# Patient Record
Sex: MALE | Race: BLACK OR AFRICAN AMERICAN | Employment: FULL TIME | URBAN - METROPOLITAN AREA
[De-identification: names, ages, dates, MRNs, and addresses within clinical notes are randomized per-mention and may not be internally consistent; named-entity substitution may affect disease eponyms.]

---

## 2017-02-08 ENCOUNTER — TELEPHONE (OUTPATIENT)
Dept: INTERNAL MEDICINE CLINIC | Age: 37
End: 2017-02-08

## 2017-02-08 NOTE — TELEPHONE ENCOUNTER
He will need to go back through orientation as it has been 1 year since last weight loss visit. Thank you!

## 2017-02-08 NOTE — TELEPHONE ENCOUNTER
Pt calling asking if he can talk to Delmar about getting back into the weight loss program.    Do we need to sign him up for the orientation again or does he need to see Delmar

## 2017-12-11 ENCOUNTER — OFFICE VISIT (OUTPATIENT)
Dept: SURGERY | Age: 37
End: 2017-12-11

## 2017-12-11 DIAGNOSIS — E66.01 OBESITY, CLASS III, BMI 40-49.9 (MORBID OBESITY) (HCC): Primary | ICD-10-CM

## 2017-12-12 VITALS — BODY MASS INDEX: 38.36 KG/M2 | HEIGHT: 76 IN | WEIGHT: 315 LBS

## 2017-12-12 NOTE — PROGRESS NOTES
Robyn Adler participated in an educational session on the importance of starting to make healthy choices prior to weight loss surgery. General healthy foods were reviewed. Diet history was reviewed. Patient set a dietary, exercise, and behavioral goal in order to start making healthy changes now.      Visit Vitals    Ht 6' 4\" (1.93 m)    Wt (!) 167.4 kg (369 lb)    BMI 44.92 kg/m2     Diandra Trujillo RD

## 2018-01-15 ENCOUNTER — OFFICE VISIT (OUTPATIENT)
Dept: SURGERY | Age: 38
End: 2018-01-15

## 2018-01-15 ENCOUNTER — CLINICAL SUPPORT (OUTPATIENT)
Dept: SURGERY | Age: 38
End: 2018-01-15

## 2018-01-15 VITALS — HEIGHT: 76 IN | BODY MASS INDEX: 38.36 KG/M2 | WEIGHT: 315 LBS

## 2018-01-15 VITALS
DIASTOLIC BLOOD PRESSURE: 95 MMHG | RESPIRATION RATE: 16 BRPM | BODY MASS INDEX: 38.36 KG/M2 | HEART RATE: 76 BPM | HEIGHT: 76 IN | WEIGHT: 315 LBS | OXYGEN SATURATION: 100 % | SYSTOLIC BLOOD PRESSURE: 148 MMHG

## 2018-01-15 DIAGNOSIS — E66.01 MORBID OBESITY WITH BODY MASS INDEX OF 40.0-49.9 (HCC): Primary | ICD-10-CM

## 2018-01-15 DIAGNOSIS — I10 ESSENTIAL HYPERTENSION: ICD-10-CM

## 2018-01-15 DIAGNOSIS — K21.9 GASTROESOPHAGEAL REFLUX DISEASE, ESOPHAGITIS PRESENCE NOT SPECIFIED: Primary | ICD-10-CM

## 2018-01-15 DIAGNOSIS — E66.01 MORBID OBESITY (HCC): ICD-10-CM

## 2018-01-15 DIAGNOSIS — E66.01 MORBID OBESITY WITH BODY MASS INDEX OF 40.0-49.9 (HCC): ICD-10-CM

## 2018-01-15 NOTE — PROGRESS NOTES
Bariatric Surgery Consultation    Subjective: The patient is a 42 y.o. obese male with a Body mass index is 43.94 kg/(m^2). Jonelle Watson The patient is at his heaviest weight for the past 5 years. 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 he has been considering surgery since last year. he desires surgery at this time because of multiple health concerns and their lifestyle issues which are hindered by their weight. he has been referred by his family physician Dr Mindy Oconnell for evaluation and treatment of their obesity via surgical intervention. Obie Tenorio has tried multiple diets in his lifetime most recently tried physician supervised, behavior modification, unsupervised diets and Atkins    Bariatric comorbidities present are   Patient Active Problem List   Diagnosis Code    Hypertension I10    Morbid obesity ([facility] [address] E66.01    Morbid obesity with body mass index of 40.0-49.9 ([facility]) E66.01    GERD (gastroesophageal reflux disease) K21.9       The patient is considering laparoscopic sleeve gastrectomy for surgical weight loss due to their ineffective progress with medical forms of weight loss and the urging of their physician who cares for their primary medical issues. The patient  now presents  for consideration for weight loss surgery understanding the benefits of this over a medical approach of weight loss as was discussed in our presentation on weight loss surgery. They have discussed their plans both with their family and primary care physician who is in support of their pursuit of such. The patient has not had health issues as of late and denies and gastrointestinal disturbances other than what is outlined below in their review of symptoms. All of their prior evaluations available by both their PCP's and specialists physicians have been reviewed today either in the Care Everywhere portal or scanned under the media tab.     I have spent a large portion of my initial consultation today reviewing the patients current dietary habits which have contributed to their health issues and obesity. I have suggested to them personally a dietary regimen that they can initiate now to help with their status as it pertains to their weight. They understand that the most important aspect of their journey through their weight loss endeavor will be their adherence to a new lifestyle of healthy eating behavior. They also understand that an adherence to an exercise program will not only help with weight loss but is ultimately important in weight maintenance. The patients goal weight is 250lb. These goals are consistent with expected outcomes of their desired operation. his Medical goals are resolution of these health issues. Patient Active Problem List    Diagnosis Date Noted    Morbid obesity ([address]     Morbid obesity with body mass index of 40.0-49.9 ([facility] [address]     GERD (gastroesophageal reflux disease)     Hypertension 12/01/2015     Past Surgical History:   Procedure Laterality Date    HX KNEE ARTHROSCOPY      HX ORTHOPAEDIC      ankle surgery / no hardware    HX TONSILLECTOMY        Social History   Substance Use Topics    Smoking status: Never Smoker    Smokeless tobacco: Never Used    Alcohol use 3.6 oz/week     6 Shots of liquor per week      Family History   Problem Relation Age of Onset    Cancer Mother     Hypertension Mother     Diabetes Mother     Diabetes Father     Hypertension Father       Current Outpatient Prescriptions   Medication Sig Dispense Refill    LORATADINE/PSEUDOEPHEDRINE (CLARITIN-D 24 HOUR PO) Take  by mouth.        No Known Allergies       Review of Systems:       General - No history or complaints of unexpected fever, chills, or weight loss  Head/Neck - No history or complaints of headache, diplopia, dysphagia, hearing loss  Cardiac - No history or complaints of chest pain, palpitations, murmur, or shortness of breath  Pulmonary - No history or complaints of shortness of breath, productive cough, hemoptysis  Gastrointestinal - minimal reflux,no  abdominal pain, obstipation/constipation or blood per rectum  Genitourinary - No history or complaints of hematuria/dysuria, stress urinary incontinence symptoms, or renal lithiasis  Musculoskeletal - mild joint pain in their knees,  no muscular weakness  Hematologic - No history or complaints of bleeding disorders,  No blood transfusions  Neurologic - No history or complaints of  migraine headaches, seizure activity, syncopal episodes, TIA or stroke  Integumentary - No history or complaints of rashes, abnormal nevi, skin cancer  Gynecological - n/a               Objective:     Visit Vitals    BP (!) 148/95 (BP 1 Location: Left arm, BP Patient Position: Sitting)    Pulse 76    Resp 16    Ht 6' 4\" (1.93 m)    Wt (!) 163.7 kg (361 lb)    SpO2 100%    BMI 43.94 kg/m2       Physical Examination: General appearance - alert, well appearing, and in no distress and oriented to person, place, and time  Mental status - alert, oriented to person, place, and time, normal mood, behavior, speech, dress, motor activity, and thought processes  Eyes - pupils equal and reactive, extraocular eye movements intact, sclera anicteric, left eye normal, right eye normal  Ears - right ear normal, left ear normal  Nose - normal and patent, no erythema, discharge or polyps and normal nontender sinuses  Mouth - mucous membranes moist, pharynx normal without lesions  Neck - supple, no significant adenopathy  Lymphatics - no palpable lymphadenopathy, no hepatosplenomegaly  Chest - clear to auscultation, no wheezes, rales or rhonchi, symmetric air entry  Heart - normal rate, regular rhythm, normal S1, S2, no murmurs, rubs, clicks or gallops  Abdomen - soft, nontender, nondistended, no masses or organomegaly  Back exam - full range of motion, no tenderness, palpable spasm or pain on motion  Neurological - alert, oriented, normal speech, no focal findings or movement disorder noted  Musculoskeletal - no joint tenderness, deformity or swelling  Extremities - peripheral pulses normal, no pedal edema, no clubbing or cyanosis    Labs:       No results found for this or any previous visit (from the past 1440 hour(s)). Assessment:     Morbid obesity with comorbidity    Plan:     laparoscopic sleeve gastrectomy    This is a 42 y.o. male with a BMI of Body mass index is 43.94 kg/(m^2). and the weight-related co-morbidties as noted below. Ap Gillespie meets the NIH criteria for bariatric surgery based upon the BMI of Body mass index is 43.94 kg/(m^2). and multiple weight-related co-morbidties. Ap Gillespie has elected laparoscopic sleeve gastrectomy as his intervention of choice for treatment of morbid obestiy through surgical means secondary to its safety profile, rapid return to work  and decreases in operative risks over gastric bypass. In the office today, following Lawrence's history and physical examination, a 30 minute discussion regarding the anatomic alterations for the laparoscopic sleeve gastrectomy was undertaken. The dietary expectations and the patient and physician dependent factors for success were thoroughly discussed, to include the need for interval follow-up and long-term dietary changes associated with success. The possible complications of the sleeve gastrectomy  were also discussed, to include;death, DVT/PE, staple line leak, bleeding, stricture formation, infection, nutritional deficiencies and sleeve dilation. Specific weight related outcomes for success were also discussed with an emphasis on careful and close follow-up with the first year and eating behavior modification as the baseline and cyclical hunger return. The patient expressed an understanding of the above factors, and his questions were answered in their entirety.     In addition, the patient attended a 1.5 hour power point seminar regarding obesity, surgical weight loss including, adjustable gastric band, gastric bypass, and sleeve gastrectomy. This discussion contrasted the different surgical techniques, mechanisms of actions and expected outcomes, and surgical and medical risks associated with each procedure. During this seminar, there was a long question and answer session where each questions was answered until there were no additional questions. Today, the patient had all of his questions answered and desires to proceed with  bariatric surgery initially choosing sleeve gastrectomy as his surgical option. Secondary Diagnoses:     Dietary Intervention  - The patient is currently scheduled to see or has been followed by a bariatric nutritionist for an attempt at preoperative weight loss as has been dictated by their insurance carrier. They will be assessed at various times during their follow up to evaluate their progress depending on the length of time that is required once again by their carrier. I have explained the importance of preoperative weight loss and the benefits regarding lower surgical risk and also assisting the patient in reaching their weight loss goal.  Finally they understand there is a physiologic benefit from the standpoint of hepatic volume reduction and reduction of central visceral adiposity preoperatively. I have reiterated the importance of a low carbohydrate and high protein regimen to achieve their stated goal. I have reviewed their current eating behavior prior to this encounter and explained to them in an exhaustive fashion the appropriate diet that they should adhere to. They have been encouraged to loose weight pre operatively and understand it is our prerogative to cancel surgery or postpone their procedure in the event of significant weight gain. The patients weight loss goal pre operatively is 5-10 pounds.     GERD -The patient understands that weight loss surgery is not a guaranteed cure for reflux disease but does understand the benefits that weight loss can have on reflux disease.  They also understand that at the time of surgery the gastroesophageal junction will be evaluated for the presence of a diaphragmatic hernia.  Hernias will be corrected always with the gastric band and sleeve gastrectomy procedures, but only on a case by case basis with the gastric bypass if it prevents our ability to perform the operation at hand, or if I feel that they would benefit long term with correction of this issue.  The patient also understands that neither weight loss surgery nor repair of a diaphragmatic hernia repair guarantees the complete cessation of the disease. They also understand there is a possibility of recurrence with a simple crural repair as is performed with these procedures. They understand they may have to continue their medications in the postoperative period. They have a good understanding that the gastric bypass procedure is better suited to total resolution of this issue and that neither the Lap Band nor sleeve gastrectomy is considered a curative procedure as it pertains to this diagnosis. Hypertension - The patient has a clear understanding of how weight loss improves hypertension as a whole, but also they understand that there is a significant genetic component to this disease process. We will monitor the patients blood pressure while in the hospital and the plan would be to continue those medications postoperatively.  If a diuretic is being used we will stop them on discharge to prevent dehydration particularly with the sleeve gastrectomy and the gastric bypass procedures.  They will be instructed to monitor their blood pressure postoperatively while at home and notify their primary care physician in the event of any significantly high or uncharacteristic readings.     Mild Weight Related Arthritis -The patient understands the benefits that weight loss surgery can have on their arthritis but also understands that weight loss is not a guaranteed cure and relief of symptoms is often dependent on the severity of the underlying disease.  The patient also understands that traditional pharmaceutical treatments for this diagnosis are usually unavailable to post-operative weight loss patients due to the effects on the gastrointestinal tract particularly with the gastric bypass and to a lesser effect with the sleeve gastrectomy.  Any changes to the patients medication treatment will ultimately be made the patients PCP with input by our office.       Signed By: Dee Mcadams MD     January 15, 2018

## 2018-01-15 NOTE — PATIENT INSTRUCTIONS
Goals: 1. Continue current exercise routine increasing as able  2. Start daily MVI ([employer] Complete Chewable) once a day before surgery and twice a day after  3. Work on eating within 2 hours of waking up   4.  Find a protein shake you can use as a meal replacement or snack

## 2018-01-15 NOTE — PATIENT INSTRUCTIONS
Body Mass Index: Care Instructions  Your Care Instructions    Body mass index (BMI) can help you see if your weight is raising your risk for health problems. It uses a formula to compare how much you weigh with how tall you are. · A BMI lower than 18.5 is considered underweight. · A BMI between 18.5 and 24.9 is considered healthy. · A BMI between 25 and 29.9 is considered overweight. A BMI of 30 or higher is considered obese. If your BMI is in the normal range, it means that you have a lower risk for weight-related health problems. If your BMI is in the overweight or obese range, you may be at increased risk for weight-related health problems, such as high blood pressure, heart disease, stroke, arthritis or joint pain, and diabetes. If your BMI is in the underweight range, you may be at increased risk for health problems such as fatigue, lower protection (immunity) against illness, muscle loss, bone loss, hair loss, and hormone problems. BMI is just one measure of your risk for weight-related health problems. You may be at higher risk for health problems if you are not active, you eat an unhealthy diet, or you drink too much alcohol or use tobacco products. Follow-up care is a key part of your treatment and safety. Be sure to make and go to all appointments, and call your doctor if you are having problems. It's also a good idea to know your test results and keep a list of the medicines you take. How can you care for yourself at home? · Practice healthy eating habits. This includes eating plenty of fruits, vegetables, whole grains, lean protein, and low-fat dairy. · If your doctor recommends it, get more exercise. Walking is a good choice. Bit by bit, increase the amount you walk every day. Try for at least 30 minutes on most days of the week. · Do not smoke. Smoking can increase your risk for health problems. If you need help quitting, talk to your doctor about stop-smoking programs and medicines. These can increase your chances of quitting for good. · Limit alcohol to 2 drinks a day for men and 1 drink a day for women. Too much alcohol can cause health problems. If you have a BMI higher than 25  · Your doctor may do other tests to check your risk for weight-related health problems. This may include measuring the distance around your waist. A waist measurement of more than 40 inches in men or 35 inches in women can increase the risk of weight-related health problems. · Talk with your doctor about steps you can take to stay healthy or improve your health. You may need to make lifestyle changes to lose weight and stay healthy, such as changing your diet and getting regular exercise. If you have a BMI lower than 18.5  · Your doctor may do other tests to check your risk for health problems. · Talk with your doctor about steps you can take to stay healthy or improve your health. You may need to make lifestyle changes to gain or maintain weight and stay healthy, such as getting more healthy foods in your diet and doing exercises to build muscle. Where can you learn more? Go to http://dez-jasmyn.info/. Enter S176 in the search box to learn more about \"Body Mass Index: Care Instructions. \"  Current as of: October 13, 2016  Content Version: 11.4  © 8529-6622 [employer]. Care instructions adapted under license by [employer] (which disclaims liability or warranty for this information). If you have questions about a medical condition or this instruction, always ask your healthcare professional. [place] [address] any warranty or liability for your use of this information. New patient Instructions      1. Ensure all pre-operative insurances requirements are complete (ie; dietary visits, psychology consults, primary care documentation, etc)    2. Adhere to pre-operative weight loss / weight maintenance plan discussed in the office today.     3. Contact the office with any questions on pre-operative clearance issues (ie; cardiology work-up, pulmonary work-up, upper GI study, etc). 4. If a barium upper GI study has been ordered for your evaluation, make sure you are on liquids only the morning of the procedure.

## 2018-01-15 NOTE — PROGRESS NOTES
Medical Weight Loss Multi-Disciplinary Program    Name: Kathya Teran   : 1980    Session# 2  Date: 1/15/2018     Height: 6' 4\" (193 cm)    Weight: (!) 163.7 kg (361 lb) lbs. Body mass index is 43.94 kg/(m^2). Pounds Lost: 8     Dietary Instructions    Reviewed intake  Understanding label reading  Understanding low carbohydrates, low sugar, higher protein meals  Understanding proper portions  Dining outside home  Instruction given for personal dietary changes  Discussed perceived compliance  Comments: Pt given brief pre/post-op diet ed and diet hx reviewed. Physical Activity/Exercise    Discussed Perceived Compliance  Reasonable Goals Set  Motivation  Comments: patient goes to the gym and runs 4 days a week for 45-60 minutes     Behavior Modification    Positive attitude  Comments: Pt is working on the following goals:    Candidate for surgery (per RD): pending     Dietitian: Mel Burgess is a 42 y.o. male who present for a pre-op evaluation. Visit Vitals    Ht 6' 4\" (1.93 m)    Wt (!) 163.7 kg (361 lb)    BMI 43.94 kg/m2     Past Medical History:   Diagnosis Date    GERD (gastroesophageal reflux disease)     uses OTC meds    Hypertension     no meds as of 2018    Morbid obesity ([address]     Morbid obesity with body mass index of 40.0-49.9 ([address]            Procedure:  laparoscopic sleeve gastrectomy     Reasons for Surgery:  BMI > 40 with one or more medically significant comorbidities    Summary:  Pt given brief pre/post-op diet ed and diet hx reviewed. Pt set several goals. See below. Current Vitamins: none     What have you done in the past to try to lose weight?  [facility] program, Ketogenic diet, vegan diet     Why didn't you lose weight or keep the weight off?: Patient was able to lose quite a bit of weight getting down to 310 doing the [facility] program, and was not able to keep it off after stopping the program     Why do you think having weight loss surgery will make it possible for you to lose weight and keep it off? Patient knows and understands he can lose weight but needs that extra push of the surgery so he can maintain his weight loss       Patient Education and Materials Provided:  Supplement Resource Guide, B Vitamin Information, MVI Recommendations, Calcium Citrate Information, Bariatric Supplement Companies, Protein Supplement Information, Fluid Requirements, No Caffeine or Carbonation, No Alcohol for One Year Post Op, 3 Balanced Meals a Day, Food Group Guide, Good Choices Dining Out, No Snacks, No Concentrated Sweets, Support System at Home, Exercising, Support Group Information and Addressed Current Habits / Changes to make    Nutritional Hx: What is the number of meals you eat per day? 2  Comment: patient typically skips breakfast     Do you eat between meals / snack? yes  Typical snack: nuts     How fast do you eat your meals? slow    How many sodas/sugared beverages do you drink per day? None     How many caffeinated drinks do you have per day? 1 cup with stevia and almond milk creamer    How much water do you drink per day?  Between  ounces a day    How often do you eat fast food? never    How often do you consume alcohol? 3 times a week; 2 Mixed drinks not really using a mixer anymore     Diet History:  Breakfast  What are you eating and how much? i   When? ii   Where? iii   Snacks  What are you eating and how much? i   When? ii   Where? iii   Hydration  What are you eating and how much? i   When? ii   Where? ii   Lunch  What are you eating and how much? i   When? ii   Where? iii   Snacks  What are you eating and how much? i   When? ii   Where? iii   Hydration  What are you eating and how much? i   When? ii   Where? iii   Dinner  What are you eating and how much? i   When? ii   Where? iii   Snacks  What are you eating and how much? i   When? ii   Where? iii   Hydration  What are you eating and how much? i   When? ii   Where? iii Exercise:  Do you currently have an exercise routine? yes  What kind of exercise do you do? patient works out doing at home tapes and going to the gym . For how long? 4 days a week  For how often? 45 -60 minutes    Goals:   1. Continue current exercise routine increasing as able  2. Start daily MVI ([employer] Complete Chewable) once a day before surgery and twice a day after  3. Work on eating within 2 hours of waking up   4.  Find a protein shake you can use as a meal replacement or snack

## 2018-07-12 ENCOUNTER — CLINICAL SUPPORT (OUTPATIENT)
Dept: FAMILY MEDICINE CLINIC | Age: 38
End: 2018-07-12

## 2018-07-12 DIAGNOSIS — E66.9 OBESITY (BMI 30-39.9): Primary | ICD-10-CM

## 2018-07-12 NOTE — PROGRESS NOTES
Patient attended a Medically Supervised Weight Loss New Patient Orientation today where we discussed:  - New Direction Very Low Calorie Diet details  - Medical Supervision  - Nutrition education  - Cost of Meal Replacements  - Policies and compliance required for program enrollment.      Patients initial consultation with physician is tentatively scheduled for:  Future Appointments  Date Time Provider Danna Pérez   8/2/2018 1:00 PM Alicia Pardo NP [facility]

## 2018-07-25 ENCOUNTER — HOSPITAL ENCOUNTER (OUTPATIENT)
Dept: LAB | Age: 38
Discharge: HOME | End: 2018-07-25

## 2018-07-25 PROCEDURE — 99001 SPECIMEN HANDLING PT-LAB: CPT | Performed by: NURSE PRACTITIONER

## 2018-07-26 LAB
25(OH)D3+25(OH)D2 SERPL-MCNC: 20.9 NG/ML (ref 30–100)
ALBUMIN SERPL-MCNC: 4.4 G/DL (ref 3.5–5.5)
ALBUMIN/GLOB SERPL: 1.9 {RATIO} (ref 1.2–2.2)
ALP SERPL-CCNC: 69 IU/L (ref 39–117)
ALT SERPL-CCNC: 39 IU/L (ref 0–44)
APPEARANCE UR: CLEAR
AST SERPL-CCNC: 20 IU/L (ref 0–40)
BASOPHILS # BLD AUTO: 0 X10E3/UL (ref 0–0.2)
BASOPHILS NFR BLD AUTO: 0 %
BILIRUB SERPL-MCNC: 0.4 MG/DL (ref 0–1.2)
BILIRUB UR QL STRIP: NEGATIVE
BUN SERPL-MCNC: 14 MG/DL (ref 6–24)
BUN/CREAT SERPL: 13 (ref 9–20)
CALCIUM SERPL-MCNC: 9.1 MG/DL (ref 8.7–10.2)
CHLORIDE SERPL-SCNC: 106 MMOL/L (ref 96–106)
CHOLEST SERPL-MCNC: 166 MG/DL (ref 100–199)
CO2 SERPL-SCNC: 24 MMOL/L (ref 20–29)
COLOR UR: YELLOW
CREAT SERPL-MCNC: 1.04 MG/DL (ref 0.76–1.27)
EOSINOPHIL # BLD AUTO: 0.1 X10E3/UL (ref 0–0.4)
EOSINOPHIL NFR BLD AUTO: 1 %
ERYTHROCYTE [DISTWIDTH] IN BLOOD BY AUTOMATED COUNT: 15 % (ref 12.3–15.4)
EST. AVERAGE GLUCOSE BLD GHB EST-MCNC: 114 MG/DL
GLOBULIN SER CALC-MCNC: 2.3 G/DL (ref 1.5–4.5)
GLUCOSE SERPL-MCNC: 88 MG/DL (ref 65–99)
GLUCOSE UR QL: NEGATIVE
HBA1C MFR BLD: 5.6 % (ref 4.8–5.6)
HCT VFR BLD AUTO: 42.7 % (ref 37.5–51)
HDLC SERPL-MCNC: 59 MG/DL
HGB BLD-MCNC: 13.9 G/DL (ref 13–17.7)
HGB UR QL STRIP: NEGATIVE
IMM GRANULOCYTES # BLD: 0 X10E3/UL (ref 0–0.1)
IMM GRANULOCYTES NFR BLD: 0 %
INTERPRETATION, 910389: NORMAL
KETONES UR QL STRIP: NEGATIVE
LDLC SERPL CALC-MCNC: 94 MG/DL (ref 0–99)
LEUKOCYTE ESTERASE UR QL STRIP: NEGATIVE
LYMPHOCYTES # BLD AUTO: 2.1 X10E3/UL (ref 0.7–3.1)
LYMPHOCYTES NFR BLD AUTO: 30 %
MAGNESIUM SERPL-MCNC: 2.1 MG/DL (ref 1.6–2.3)
MCH RBC QN AUTO: 26.6 PG (ref 26.6–33)
MCHC RBC AUTO-ENTMCNC: 32.6 G/DL (ref 31.5–35.7)
MCV RBC AUTO: 82 FL (ref 79–97)
MICRO URNS: NORMAL
MONOCYTES # BLD AUTO: 0.4 X10E3/UL (ref 0.1–0.9)
MONOCYTES NFR BLD AUTO: 5 %
NEUTROPHILS # BLD AUTO: 4.4 X10E3/UL (ref 1.4–7)
NEUTROPHILS NFR BLD AUTO: 64 %
NITRITE UR QL STRIP: NEGATIVE
PH UR STRIP: 5 [PH] (ref 5–7.5)
PLATELET # BLD AUTO: 244 X10E3/UL (ref 150–379)
POTASSIUM SERPL-SCNC: 4.4 MMOL/L (ref 3.5–5.2)
PROT SERPL-MCNC: 6.7 G/DL (ref 6–8.5)
PROT UR QL STRIP: NEGATIVE
RBC # BLD AUTO: 5.23 X10E6/UL (ref 4.14–5.8)
SODIUM SERPL-SCNC: 145 MMOL/L (ref 134–144)
SP GR UR: 1.02 (ref 1–1.03)
TRIGL SERPL-MCNC: 64 MG/DL (ref 0–149)
URATE SERPL-MCNC: 7.6 MG/DL (ref 3.7–8.6)
UROBILINOGEN UR STRIP-MCNC: 0.2 MG/DL (ref 0.2–1)
VLDLC SERPL CALC-MCNC: 13 MG/DL (ref 5–40)
WBC # BLD AUTO: 7 X10E3/UL (ref 3.4–10.8)

## 2018-08-02 ENCOUNTER — OFFICE VISIT (OUTPATIENT)
Dept: INTERNAL MEDICINE CLINIC | Age: 38
End: 2018-08-02

## 2018-08-02 VITALS
DIASTOLIC BLOOD PRESSURE: 102 MMHG | RESPIRATION RATE: 18 BRPM | HEIGHT: 76 IN | BODY MASS INDEX: 38.36 KG/M2 | TEMPERATURE: 98.9 F | OXYGEN SATURATION: 97 % | WEIGHT: 315 LBS | SYSTOLIC BLOOD PRESSURE: 144 MMHG | HEART RATE: 88 BPM

## 2018-08-02 DIAGNOSIS — E66.01 MORBID OBESITY WITH BMI OF 45.0-49.9, ADULT (HCC): Primary | ICD-10-CM

## 2018-08-02 DIAGNOSIS — I10 ESSENTIAL HYPERTENSION: ICD-10-CM

## 2018-08-02 DIAGNOSIS — K21.9 GASTROESOPHAGEAL REFLUX DISEASE, ESOPHAGITIS PRESENCE NOT SPECIFIED: ICD-10-CM

## 2018-08-02 RX ORDER — VERAPAMIL HYDROCHLORIDE 240 MG/1
240 CAPSULE, EXTENDED RELEASE ORAL DAILY
Qty: 30 CAP | Refills: 2 | Status: SHIPPED | OUTPATIENT
Start: 2018-08-02 | End: 2018-08-31 | Stop reason: SDUPTHER

## 2018-08-02 NOTE — MR AVS SNAPSHOT
[facility] 
 
 
 [address], [address] 
[phone] Patient: Rose Trevino MRN: [id number] RBR:8[birth year]40 Visit Information Date & Time Provider Department Dept. Phone Encounter #  
 8/2/2018  1:00 PM Fidel Mei NP [facility] [phone] Follow-up Instructions Return in about 4 weeks (around 8/30/2018). Your Appointments 8/30/2018  9:89 AM  
METABOLIC PROGRAM 15 with Fidel Mei NP [facility] ([facility]) Appt Note: 4 week f/u  
 [address] [id number] [address] [place]  
  
   
 [address], [address]  
  
    
 8/31/2018 10:00 AM  
New Patient with Jeyson Ferrera MD  
[facility] Appt Note: nppe establish care [address], [facility] [address] [address] [place]  
  
   
 [address], [address] Upcoming Health Maintenance Date Due DTaP/Tdap/Td series (1 - Tdap) 2/7/1999 Influenza Age 5 to Adult 8/1/2018 Allergies as of 8/2/2018  Review Complete On: 8/2/2018 By: Fidel Mei NP Severity Noted Reaction Type Reactions Lozol [Indapamide] High 08/02/2018    Other (comments) Severe cramping in stomach area Current Immunizations  Never Reviewed No immunizations on file. Not reviewed this visit You Were Diagnosed With   
  
 Codes Comments Morbid obesity with BMI of 45.0-49.9, adult ([facility]    -  Primary ICD-10-CM: E66.01, Z68.42 
ICD-9-CM: 278.01, V85.42 Essential hypertension     ICD-10-CM: I10 
ICD-9-CM: 401.9 Gastroesophageal reflux disease, esophagitis presence not specified     ICD-10-CM: K21.9 ICD-9-CM: 530.81 Vitals BP Pulse Temp Resp Height(growth percentile) Weight(growth percentile) (!) 190/121 (BP 1 Location: Left arm, BP Patient Position: Sitting) 88 98.9 °F (37.2 °C) (Oral) 18 6' 4\" (1.93 m) (!) 379 lb 3.2 oz (172 kg) SpO2 BMI Smoking Status 97% 46.16 kg/m2 Never Smoker Vitals History BMI and BSA Data Body Mass Index Body Surface Area  
 46.16 kg/m 2 3.04 m 2 Preferred Pharmacy Pharmacy Name Phone [address] [place] [address], [facility] [phone] Your Updated Medication List  
  
   
This list is accurate as of 8/2/18  2:08 PM.  Always use your most recent med list.  
  
  
  
  
 verapamil  mg ER capsule Commonly known as:  Ney Wood Take 1 Cap by mouth daily. Prescriptions Sent to Pharmacy Refills  
 verapamil ER (VERELAN) 240 mg ER capsule 2 Sig: Take 1 Cap by mouth daily. Class: Normal  
 Pharmacy: [address] [facility] [address] [facility], [address] [facility] #: [phone] Route: Oral  
  
Follow-up Instructions Return in about 4 weeks (around 8/30/2018). Patient Instructions Health Maintenance Due Topic Date Due  
 DTaP/Tdap/Td series (1 - Tdap) 02/07/1999  Influenza Age 5 to Adult  08/01/2018 Monthly goal: 
 
10-15 lbs weight loss 4 meal replacements a day. No other food. First one within about 1 hour of waking up to get metabolism started. Don't go more than 6 hours between meals. No more than 1 soup a day- too much salt No more than 1 bar a day- not enough protein.  
  
10 carbs extra a day. Compliance 
  
We do not expect perfection. However, we do ask for your persistence and your willingness to do the work of growing yourself.  
  
You will hit plateaus in your weight loss. You will run into situations that test your will power. Alessia Tovar will encounter times when you feel frustrated. It's OK if you slip up from time to time.  However, how your respond to your slip ups and, the adjustments you make to prevent future slip ups will determine you long-term success. 
  
If you find yourself temporarily slipping in the program, it's OK. We will gently nudge you forward and encourage you to do the work of identifying and moving beyond your stuck areas. However, if you find yourself wavering in the program for a prolonged time (more than 3 or 4 months) we will ask that you take a break from the program. At that time you will 3 options:  
  
1. Work with our weight loss and nutrition program at [facility] to explore additional weight loss options.  
  
2. Work with a counselor to do some focused work in order to identify and break the patterns that are holding you back.  
  
3. The combination of both these. 
  
Once you, your counselor and/or provider feel that you have moved past those patterns and want to give the program another chance, we will gladly work with you to determine if you're ready to start back in the program. 
  
When returning after a break, if it's been less than 3 months, you do not need to repeat an orientation, labs or an EKG. If it's over been 3 months you will required to repeat an orientation and, if greater than 6 months, you will be required to repeat an orientation, labs and an EKG.   
  
Additional Tips 
  
- Consume your 4 daily meal replacements equally spaced over the day No more than 6 hours between each meal 
Breakfast is especially important 
  
- Get the support of family and friends 
  
- Snack-proof your house 
  
- Have strategies for social situations, meetings that run over or vacation 
  
  
- When you fall off the plan it's no big deal; just start right back. Turn those days into examples of what to change or avoid next time.  
  
  
 
Homework for [employer] 
 
Exercise Exercise daily  
- Start slow, gradually increase your time by around 10 to 20 % a week - To prevent injury, take a recovery week every 4 weeks (reduce your exercise time and intensity by 1/2 during this time) - Your goal is to work up to 150 min a week; hard enough that you can't whistle or sing. It may take 6 months to work up to this. - Call the Health  at [facility] labs for exercise ideas ([phone]) Diet Common Side Effects 
 
-Constipation 
-Fatigue 
-Hunger 
-Low sodium 
-Hair Loss 1. Constipation  
     -around 1 out of 5 chance it happens at all 
     -around 1 out of 10 chance you'll need to take something (see below) It can be prevented by Drinking at least 8 glasses of water a day If you're prone to constipation: - Take a Stool Softener every day:  (eg - Dulcolax Stool Softener 100 mg or Miralax) - Eat Plenty of Fiber Get the New Direction products with fiber added Keep your fiber intake to at least 20 grams a day Use SUGAR-FREE products: Fiber One products, Benefiber or Metamucil If you get constipated: 1. Start with a Stool Softener (produces a bowel movement in 72 hr): 
 Examples: 
  Miralax 1 capful twice a day (It's OK to go up to 3 caps for a few days) Dulcolax Stool Softener 100 mg 
 
2. If you still have constipation after 2 or 3 days, add a Stimulant Laxative to the Stool Softener (this will produce a bowel movement in 6 - 12 hr): 
 Examples: 
  Exlax (1 small square) for up to 4 days Dulcolax stimulant laxative Magnesium citrate pill 500 mg start with once a day and go up to 3 times a day if needed 3. Or you can go straight to a combination Stool Softner / Stimulant at night. If you need, you can add a morning dose. Examples: 
  Pericolace 50 mg / 8.25 mg Sennakot-S  50 mg / 8.25 mg 
 
4. If You're Really locked up, get Liquid Magnesium Citrate (take according to the      directions) 2. Fatigue 
     -Everyone goes through this stage More common in the first 2 weeks It's your body adjusting to the Ketogenic diet and it's normal  
 
 
3. Hunger More common in the first few days After your body adjusts to the Ketogenic diet it will go away 4. Low blood levels of sodium 
     -Not very common, especially if you're not taking a fluid pil If you develop a headache, feel fatigued, light-headed or dizzy Add 1/2 cup of bouillon broth every day 5. Hair loss 
     -This is fairly uncommon; you may see more than a usual amount of hair in your brush or the shower drain This can happen with physical stress (surgery, trauma or calorie restriction) or psychological stress. Although is it very concerning, it is often temporary and will resolve within 3 months. Some people notice a benefit from taking a daily dose of Biotin 2,500 mcg and increasing their Fish Oil intake. Other Tips and Helpful Information - Get the following books (all found on [employer]) Change Anything by Anuj Yuan, Suzanne Roldan, and Geremias Rivas Mindless Eating by Desmond Logan Perfectly Yourself by Citlali Santizo, Myself and I - 28 Days to Self-Love by Shyann Mays - Consume your 4 daily meal replacements equally spaced over the    day No more than 6 hours between each meal 
 Breakfast is especially important - Get the support of family and friends 
 
- Snack-proof your house - Have strategies for social situations, meetings that run over or vacation - When you fall off the plan it's no big deal; just start right back; turn those days into examples of what to avoid next time. Long-term Healthy Weight / Body Fat Different ways to determining your ideal weight: 
1. Keep your waist to less than 1/2 of your height 2. Keep your % body fat to under 30% for female and under 20% if male 3. Keep your BMI around 25 Supplements 1. Vitacost Synergy Basic Multi-Vitamin (Their In-House Brand) Take 1 capsule twice a day Found ONLY at [facility], NOT at [facility], [facility], the Vitamin Shoppe, etc 
    SKU #: [id number]  
    $16.49   / 1 month supply 
    www. [employer]  [phone]  
 
 
2. N-Acetyl Cysteine (NAC) -- 600 mg 
     1 pill once a day Found at many stores but [address] has a good price: 
     Item #: [id number] [id number]  $9.99 / 120 pills (4 month supply) 3. Turmeric with Black Pepper Extract (or Bioperine) 500 mg 
    1 pill 1-2 times a day (more is joint pain or increased inflammation) Found at many stores but [address] has a good price: 
    SKU #: [id number]  $13.64 / 60 pills 4. Fish Oil Take 1 capsule a day [employer] Brand: \"Omega 3 Fish Oil (per pill:  )\" OR 
 
Take 1 Tbsp 3 days a week of any of the following: 
 
V[employer] Brand: Lemon or Orange flavored Fish Oil Nutra Sea + D:  Apple flavored Nutra Sea:  Nathan flavored (These are found at most Vitamin Stores or on [address] [facility] [address]) FYI:  
Typical fish oil per pill =  mg and  mg 
Krill oil per pill = EPA 50 - 70 mg and DHA 27 - 250 mg 
 
 
^^^^^^^^^^^^^^^^^^^^^^^^^^^^^^^^^^^^^^^^^^^^^^^^^^^^^^^^^^^^^^^^^^^^^^^^^^^^^^^^^^^^^^^^^^^^^^^ Carbohydrates If you do not metabolize carbohydrates well, you will lose weight and keep the weigh off by keeping your carbohydrate intake low. How do you know if you do not metabolize carbs well? If your Triglycerides, sdLCL-C and Insulin Resistance are elevated, then you will do well to follow a low carb diet. Fun Facts About Carbs - The Typical American Diet = 450 grams a day - The Reducing Phase of the VLCD = 40 grams a day - Target for weight loss maintenance: 
 - Eat no more than 30 grams per meal 
 - Eat no more than 10 grams per snack (mid-morning and mid-afternoon snack) Resources for finding out where carbs hide in our foods: 
1. Our Registered Dietitians 2. Www. [employer]. [employer]ric[employer]/tools 3. Read food labels 4. Books - The Calorie Ermelinda Burner - The New Atkins Diet for a New You 
     - Alice Gallo's NEW Carb and Calorie 4th Edition (my favorite) 5. Smart phone and Internet-based apps - [employer]  
     - Carb Manager If you want to get a better picture of your cardiac risk, consider getting a coronary calcium score:  Call [phone] to schedule one. Introducing [facility]! Dear Yazmin Tang: Thank you for requesting a IIZI group account. Our records indicate that you already have an active IIZI group account. You can access your account anytime at [email or web address] Did you know that you can access your hospital and ER discharge instructions at any time in IIZI group? You can also review all of your test results from your hospital stay or ER visit. Additional Information If you have questions, please visit the Frequently Asked Questions section of the IIZI group website at [email or web address] Remember, IIZI group is NOT to be used for urgent needs. For medical emergencies, dial 911. Now available from your iPhone and Android! Please provide this summary of care documentation to your next provider. Your primary care clinician is listed as Paul Werner. If you have any questions after today's visit, please call [phone].

## 2018-08-02 NOTE — PROGRESS NOTES
VLCD Progress Note: Initial Physician Visit    Arleen Haro is a 34 y.o. male who is here for medical screening for the VLCD Program.    Pt with HTN hx, has been on lozol with severe abd cramping and fatigue, was switched to norvasc and had LE edema. Was seen by cardiology, note personally reviewed, with normal EKG and low normal EF 55%. Also had noted elevated liver enzymes with GI follow up and normal liver US. Pt has not been on blood pressure medication recently. He states he takes home blood pressure readings that are usually 286-060 systolic. Discussed multiple options of starting on antihypertensives. Will use CCB as per Dr. Brian Calvert- Cardiology recommendation and pt intolerant to norvasc. He has follow up with Dr. Krista Garza scheduled for end of this month to start with new PCP. Weight History  Current weight 379.2lb Body mass index is 46.16 kg/(m^2). Goal weight 280 lb  Highest weight 389 lb, at 34years old    Weight loss History  How many weight loss attempts have you had? 2  Which program were you most successful at? [facility]    Significant Medical History  MI w/ in the last 3 months: no  Type I Diabetes: no  Type II Diabetes: no  Liver or Kidney disease requiring protein restriction: no  Pregnant or planning on becoming pregnant w/in 6 months: no  Recent treatment for Cancer: no  History of Uric-acid Kidney Stone or elevated Uric Acid: no  Peptic ulcer disease not well controlled: no  Recent onset of Inflammatory Bowel Disease: no    If female:  No LMP for male patient.   n/a    Significant Psychosocial History  Major lifestyle changes: no   Other commitments: no   Any potential unsupportive: no     Current psychotherapy: no  Ever hospitalized for psychiatric reasons: no  History of depression: no  History of suicidal ideation: no  Dramatic mood changes during dieting: no  History of binge eating: no  If yes, is there a history of purging: no    Social History  Social History   Substance Use Topics    Smoking status: Never Smoker    Smokeless tobacco: Never Used    Alcohol use 3.6 oz/week     6 Shots of liquor per week       Has Angélica Patel ever been told by a physician not to exercise: no    Does Angélica Patel know of any reason they shouldn't exercise: no  If yes, why? Does Angélica Patel have any food allergies or sensitivities: no    Allergies include: Allergies   Allergen Reactions    Lozol [Indapamide] Other (comments)     Severe cramping in stomach area       PHQ over the last two weeks 8/2/2018   Little interest or pleasure in doing things Not at all   Feeling down, depressed, irritable, or hopeless Not at all   Total Score PHQ 2 0         Objective    Visit Vitals    BP (!) 144/102 (BP 1 Location: Right arm, BP Patient Position: Sitting)    Pulse 88    Temp 98.9 °F (37.2 °C) (Oral)    Resp 18    Ht 6' 4\" (1.93 m)    Wt (!) 379 lb 3.2 oz (172 kg)    SpO2 97%    BMI 46.16 kg/m2     Appearance: Obese, A&O x 3, NAD  HEENT:  NC/AT, PERRL  Neck:  No nodes, no JVD  Heart:  RRR without M/R/G  Lungs:  CTAB, no rhonchi, rales, or wheezes with good air exchange   Abdomen:  Non-tender, pos bowel sounds, no hepatosplenomegally  Ext:  No C/C/E      Labs    [facility] labs reviewed extensively with patient, will follow up with PCP concerning blood pressure.   Will continue with monthly CMP, uric acid checks    CBC:   Lab Results   Component Value Date/Time    WBC 7.0 07/25/2018 12:00 AM    RBC 5.23 07/25/2018 12:00 AM    HGB 13.9 07/25/2018 12:00 AM    HCT 42.7 07/25/2018 12:00 AM    PLATELET 555 74/36/6234 12:00 AM     CMP:   Lab Results   Component Value Date/Time    Glucose 88 07/25/2018 12:00 AM    Sodium 145 (H) 07/25/2018 12:00 AM    Potassium 4.4 07/25/2018 12:00 AM    Chloride 106 07/25/2018 12:00 AM    CO2 24 07/25/2018 12:00 AM    BUN 14 07/25/2018 12:00 AM    Creatinine 1.04 07/25/2018 12:00 AM    Calcium 9.1 07/25/2018 12:00 AM    Anion gap 10 01/19/2016 10:19 AM BUN/Creatinine ratio 13 07/25/2018 12:00 AM    Alk. phosphatase 69 07/25/2018 12:00 AM    Protein, total 6.7 07/25/2018 12:00 AM    Albumin 4.4 07/25/2018 12:00 AM    Globulin 3.4 01/19/2016 10:19 AM    A-G Ratio 1.9 07/25/2018 12:00 AM      Lab Results   Component Value Date/Time    Hemoglobin A1c 5.6 07/25/2018 12:00 AM    Hemoglobin A1c 5.4 01/19/2016 10:19 AM    Glucose 88 07/25/2018 12:00 AM    LDL, calculated 94 07/25/2018 12:00 AM    Creatinine 1.04 07/25/2018 12:00 AM      Lab Results   Component Value Date/Time    Cholesterol, total 166 07/25/2018 12:00 AM    HDL Cholesterol 59 07/25/2018 12:00 AM    LDL, calculated 94 07/25/2018 12:00 AM    Triglyceride 64 07/25/2018 12:00 AM    CHOL/HDL Ratio 4.0 01/19/2016 10:19 AM     Lab Results   Component Value Date/Time    TSH 0.67 01/19/2016 10:19 AM    TSH 0.73 01/13/2016 10:00 AM          Assessment/Plan    ICD-10-CM ICD-9-CM    1. Morbid obesity with BMI of 45.0-49.9, adult ([facility] E66.01 278.01 CANCELED: AMB POC EKG ROUTINE W/ 12 LEADS, INTER & REP    Z68.42 V85.42    2. Essential hypertension I10 401.9 verapamil ER (VERELAN) 240 mg ER capsule   3. Gastroesophageal reflux disease, esophagitis presence not specified K21.9 530.81        Diet regimen   # of meal replacements prescribed: 0, if blood pressure ok after 1 week on new medication will start on 4 replacements/day   If modified LCD-nutritional guidelines:    Monthly Goal   15 lbs weight loss    Medical monitoring schedule:   Weekly BP/Weight checks   Monthly provider appointments      I have reviewed/discussed the above normal BMI with the patient. I have recommended the following interventions: dietary management education, guidance, and counseling, monitor weight and prescribed dietary intake . .      Mr. Sofya Sandoval has a reminder for a \"due or due soon\" health maintenance. I have asked that he contact his primary care provider for follow-up on this health maintenance.

## 2018-08-02 NOTE — PROGRESS NOTES
Chief Complaint   Patient presents with    Weight Management     Consult     1. Have you been to the ER, urgent care clinic since your last visit? Hospitalized since your last visit? No    2. Have you seen or consulted any other health care providers outside of the [address] [place] [address] since your last visit? Include any pap smears or colon screening.  No

## 2018-08-02 NOTE — PATIENT INSTRUCTIONS
Health Maintenance Due   Topic Date Due    DTaP/Tdap/Td series (1 - Tdap) 02/07/1999    Influenza Age 5 to Adult  08/01/2018         Monthly goal:    10-15 lbs weight loss    4 meal replacements a day. No other food. First one within about 1 hour of waking up to get metabolism started. Don't go more than 6 hours between meals. No more than 1 soup a day- too much salt  No more than 1 bar a day- not enough protein.      10 carbs extra a day. Compliance     We do not expect perfection. However, we do ask for your persistence and your willingness to do the work of growing yourself.      You will hit plateaus in your weight loss. You will run into situations that test your will power. Ronny Velasco will encounter times when you feel frustrated. It's OK if you slip up from time to time. However, how your respond to your slip ups and, the adjustments you make to prevent future slip ups will determine you long-term success.     If you find yourself temporarily slipping in the program, it's OK. We will gently nudge you forward and encourage you to do the work of identifying and moving beyond your stuck areas. However, if you find yourself wavering in the program for a prolonged time (more than 3 or 4 months) we will ask that you take a break from the program. At that time you will 3 options:      1. Work with our weight loss and nutrition program at [facility] to explore additional weight loss options.      2. Work with a counselor to do some focused work in order to identify and break the patterns that are holding you back.      3. The combination of both these.     Once you, your counselor and/or provider feel that you have moved past those patterns and want to give the program another chance, we will gladly work with you to determine if you're ready to start back in the program.     When returning after a break, if it's been less than 3 months, you do not need to repeat an orientation, labs or an EKG.  If it's over been 3 months you will required to repeat an orientation and, if greater than 6 months, you will be required to repeat an orientation, labs and an EKG.       Additional Tips     - Consume your 4 daily meal replacements equally spaced over the day  No more than 6 hours between each meal  Breakfast is especially important     - Get the support of family and friends     - Snack-proof your house     - Have strategies for social situations, meetings that run over or vacation        - When you fall off the plan it's no big deal; just start right back. Turn those days into examples of what to change or avoid next time.           Homework for [employer]    Exercise    Exercise daily   - Start slow, gradually increase your time by around 10 to 20 % a week    - To prevent injury, take a recovery week every 4 weeks (reduce your exercise time and intensity by 1/2 during this time)    - Your goal is to work up to 150 min a week; hard enough that you can't whistle or sing. It may take 6 months to work up to this. - Call the Health  at [facility] labs for exercise ideas ([phone])          Diet          Common Side Effects    -Constipation  -Fatigue  -Hunger  -Low sodium  -Hair Loss      1. Constipation        -around 1 out of 5 chance it happens at all       -around 1 out of 10 chance you'll need to take something (see below)    It can be prevented by Drinking at least 8 glasses of water a day    If you're prone to constipation:  - Take a Stool Softener every day:  (eg - Dulcolax Stool Softener 100 mg or Miralax)  - Eat Plenty of Fiber   Get the New Direction products with fiber added   Keep your fiber intake to at least 20 grams a day   Use SUGAR-FREE products: Fiber One products, Benefiber or Metamucil      If you get constipated:  1.  Start with a Stool Softener (produces a bowel movement in 72 hr):   Examples:    Miralax 1 capful twice a day (It's OK to go up to 3 caps for a few days)    Dulcolax Stool Softener 100 mg    2. If you still have constipation after 2 or 3 days, add a Stimulant Laxative to the Stool Softener (this will produce a bowel movement in 6 - 12 hr):   Examples:    Exlax (1 small square) for up to 4 days    Dulcolax stimulant laxative    Magnesium citrate pill 500 mg start with once a day and go up to 3 times a day if needed    3. Or you can go straight to a combination Stool Softner / Stimulant at night. If you need, you can add a morning dose. Examples:    Pericolace 50 mg / 8.25 mg    Sennakot-S  50 mg / 8.25 mg    4. If You're Really locked up, get Liquid Magnesium Citrate (take according to the      directions)      2. Fatigue       -Everyone goes through this stage  More common in the first 2 weeks  It's your body adjusting to the Ketogenic diet and it's normal       3. Hunger  More common in the first few days  After your body adjusts to the Ketogenic diet it will go away       4. Low blood levels of sodium       -Not very common, especially if you're not taking a fluid pil  If you develop a headache, feel fatigued, light-headed or dizzy  Add 1/2 cup of bouillon broth every day      5. Hair loss       -This is fairly uncommon; you may see more than a usual amount of hair in your brush or the shower drain  This can happen with physical stress (surgery, trauma or calorie restriction) or psychological stress. Although is it very concerning, it is often temporary and will resolve within 3 months. Some people notice a benefit from taking a daily dose of Biotin 2,500 mcg and increasing their Fish Oil intake.       Other Tips and Helpful Information    - Get the following books (all found on [address])    Change Anything by Alexia Negro, Kiki Montalvo, and Ivis Barreto    Mindless Eating by Minor Emperor    Perfectly Yourself by Narinder Noun    Me, Myself and I - 28 Days to Self-Love by Ney Haas your 4 daily meal replacements equally spaced over the    day   No more than 6 hours between each meal   Breakfast is especially important    - Get the support of family and friends    - Snack-proof your house    - Have strategies for social situations, meetings that run over or vacation      - When you fall off the plan it's no big deal; just start right back; turn those days into examples of what to avoid next time. Long-term Healthy Weight / Body Fat  Different ways to determining your ideal weight:  1. Keep your waist to less than 1/2 of your height   2. Keep your % body fat to under 30% for female and under 20% if male  3. Keep your BMI around 25        Supplements      1. Vitacost Synergy Basic Multi-Vitamin (Their In-House Brand)      Take 1 capsule twice a day        Found ONLY at [facility], NOT at [facility], [employer], [employer], the Vitamin Shoppe, etc      SKU #: [id number]       $16.49   / 1 month supply      www. [employer]  [phone]       2. N-Acetyl Cysteine (NAC) -- 600 mg       1 pill once a day       Found at many stores but [employer] has a good price:       Item #: [id number]  $9.99 / 120 pills (4 month supply)      3. Turmeric with Black Pepper Extract (or Bioperine) 500 mg      1 pill 1-2 times a day (more is joint pain or increased inflammation)      Found at many stores but [employer] has a good price:      SKU #: [id number]  $13.64 / 60 pills        4.  Fish Oil      Take 1 capsule a day      Vitamin Shoppe Brand: \"Omega 3 Fish Oil (per pill:  )\"                                                               OR    Take 1 Tbsp 3 days a week of any of the following:    Vitamin Shoppe Brand: Lemon or Orange flavored Fish Oil   Nutra Sea + D:  Apple flavored   Nutra Sea:  [place] flavored   (These are found at most Vitamin Stores or on [employer])    FYI:   Typical fish oil per pill =  mg and  mg  Krill oil per pill = EPA 50 - 70 mg and DHA 27 - 250 mg      ^^^^^^^^^^^^^^^^^^^^^^^^^^^^^^^^^^^^^^^^^^^^^^^^^^^^^^^^^^^^^^^^^^^^^^^^^^^^^^^^^^^^^^^^^^^^^^^      Carbohydrates     If you do not metabolize carbohydrates well, you will lose weight and keep the weigh off by keeping your carbohydrate intake low. How do you know if you do not metabolize carbs well? If your Triglycerides, sdLCL-C and Insulin Resistance are elevated, then you will do well to follow a low carb diet. Fun Facts About Carbs    - The Typical American Diet = 450 grams a day    - The Reducing Phase of the VLCD = 40 grams a day    - Target for weight loss maintenance:   - Eat no more than 30 grams per meal   - Eat no more than 10 grams per snack (mid-morning and mid-afternoon snack)        Resources for finding out where carbs hide in our foods:  1. Our Registered Dietitians    2. Www. Atkins. com/tools    3. Read food labels    4. Books       - The Calorie Dasha Emms       - The Relavance Software System Diet for a New You       - Alice Gallo's NEW Carb and Calorie 4th Edition (my favorite)    5. Smart phone and Internet-based apps       - ChunyuPal        - Carb Manager      If you want to get a better picture of your cardiac risk, consider getting a coronary calcium score:  Call [phone] to schedule one.

## 2018-08-08 ENCOUNTER — CLINICAL SUPPORT (OUTPATIENT)
Dept: FAMILY MEDICINE CLINIC | Age: 38
End: 2018-08-08

## 2018-08-08 DIAGNOSIS — E66.01 MORBID OBESITY (HCC): Primary | ICD-10-CM

## 2018-08-08 NOTE — PROGRESS NOTES
Progress Note: Weekly Education Class in the [facility] Weight Loss Program   Is there anything that you or the patient needs to let the [facility] Physician know about? no  Over the past week, have you experienced any side-effects? no    Paulo Ely is a 34 y.o. male who is enrolled in [facility] Weight Loss Program    Visit Vitals    /90 (BP 1 Location: Right arm, BP Patient Position: Sitting)  Comment: manual    Pulse 95    Resp 16    Ht 6' 4\" (1.93 m)    Wt (!) 386 lb 3.2 oz (175.2 kg)    BMI 47.01 kg/m2     Weight Metrics 8/8/2018 8/8/2018 8/2/2018 8/2/2018 1/15/2018 1/15/2018 12/11/2017   Weight - 386 lb 3.2 oz - 379 lb 3.2 oz 361 lb 361 lb 369 lb   Waist Measure Inches 58 - 59 - - - -   Exercise Mins/week 60 - 0 - - - -   Body Fat % - - 39.2 - - - -   BMI - 47.01 kg/m2 - 46.16 kg/m2 43.94 kg/m2 43.94 kg/m2 44.92 kg/m2         Have you received any other medical care this week? no  If yes, where and for what? Have you had any change in your medications since your last visit? no  If yes what? Did you have any problems adhering to the program last week? yes  If yes, please explain: has not started the Weight Loss Program at this time       Eating Habits Over Last Week:  Did you take in 64 oz of non-caloric fluids?  no     Did you consume your 4 meal replacements each day? no       Physical Activity Over the Past Week:    Aerobic exercise: 60 min  Resistance exercise: 0 workouts / week

## 2018-08-09 ENCOUNTER — TELEPHONE (OUTPATIENT)
Dept: INTERNAL MEDICINE CLINIC | Age: 38
End: 2018-08-09

## 2018-08-09 VITALS
RESPIRATION RATE: 16 BRPM | BODY MASS INDEX: 38.36 KG/M2 | SYSTOLIC BLOOD PRESSURE: 170 MMHG | WEIGHT: 315 LBS | DIASTOLIC BLOOD PRESSURE: 90 MMHG | HEART RATE: 95 BPM | HEIGHT: 76 IN

## 2018-08-09 DIAGNOSIS — E66.01 MORBID OBESITY WITH BODY MASS INDEX OF 40.0-49.9 (HCC): Primary | ICD-10-CM

## 2018-08-09 NOTE — TELEPHONE ENCOUNTER
Chief Complaint   Patient presents with    Other     patient was informed he can start the Weight Loss Program     Left a voice message that the patient can start the Weight Loss Program, and to call ahead to make sure someone will be here when he arrives to purchase his products. I also sent a [employer].

## 2018-08-13 NOTE — PROGRESS NOTES
Nurse note from patient's weekly VLCD / LCD / Maintenance class was reviewed. Pertinent medical concerns were:  None noted.    BP elev the last few visits but has appt later this month w pcp    Continue current regimen

## 2018-08-15 ENCOUNTER — CLINICAL SUPPORT (OUTPATIENT)
Dept: FAMILY MEDICINE CLINIC | Age: 38
End: 2018-08-15

## 2018-08-15 VITALS
HEART RATE: 89 BPM | HEIGHT: 76 IN | WEIGHT: 315 LBS | DIASTOLIC BLOOD PRESSURE: 90 MMHG | SYSTOLIC BLOOD PRESSURE: 140 MMHG | BODY MASS INDEX: 38.36 KG/M2

## 2018-08-15 DIAGNOSIS — E66.01 MORBID OBESITY (HCC): Primary | ICD-10-CM

## 2018-08-15 NOTE — PROGRESS NOTES
Progress Note: Weekly Education Class in the [facility] Weight Loss Program   Is there anything that you or the patient needs to let the [facility] Physician know about? no  Over the past week, have you experienced any side-effects? no    Matt Solis is a 34 y.o. male who is enrolled in [facility] Weight Loss Program    Visit Vitals    /90 (BP 1 Location: Left arm, BP Patient Position: Sitting)  Comment (BP 1 Location): taken manual    Pulse 89    Ht 6' 4\" (1.93 m)    Wt (!) 374 lb 9.6 oz (169.9 kg)    BMI 45.6 kg/m2     Weight Metrics 8/15/2018 8/15/2018 8/8/2018 8/8/2018 8/2/2018 8/2/2018 1/15/2018   Weight - 374 lb 9.6 oz - 386 lb 3.2 oz - 379 lb 3.2 oz 361 lb   Waist Measure Inches 58 - 58 - 59 - -   Exercise Mins/week - - 60 - 0 - -   Body Fat % - - - - 39.2 - -   BMI - 45.6 kg/m2 - 47.01 kg/m2 - 46.16 kg/m2 43.94 kg/m2         Have you received any other medical care this week? no  If yes, where and for what? Have you had any change in your medications since your last visit? no  If yes what? Did you have any problems adhering to the program last week? no  If yes, please explain:       Eating Habits Over Last Week:  Did you take in 64 oz of non-caloric fluids? no     Did you consume your 4 meal replacements each day?  yes       Physical Activity Over the Past Week:    Aerobic exercise: 150 min  Resistance exercise: 0 workouts / week

## 2018-08-22 ENCOUNTER — CLINICAL SUPPORT (OUTPATIENT)
Dept: FAMILY MEDICINE CLINIC | Age: 38
End: 2018-08-22

## 2018-08-22 DIAGNOSIS — E66.01 MORBID OBESITY WITH BODY MASS INDEX OF 40.0-49.9 (HCC): Primary | ICD-10-CM

## 2018-08-23 VITALS
BODY MASS INDEX: 38.36 KG/M2 | HEART RATE: 97 BPM | SYSTOLIC BLOOD PRESSURE: 130 MMHG | RESPIRATION RATE: 18 BRPM | HEIGHT: 76 IN | DIASTOLIC BLOOD PRESSURE: 80 MMHG | WEIGHT: 315 LBS

## 2018-08-23 NOTE — PROGRESS NOTES
Progress Note: Weekly Education Class in the [facility] Weight Loss Program   Is there anything that you or the patient needs to let the [address] [facility] Physician know about? no  Over the past week, have you experienced any side-effects? no    Sha Quintero is a 34 y.o. male who is enrolled in [facility] Weight Loss Program    Visit Vitals    /80 (BP 1 Location: Left arm, BP Patient Position: Sitting)  Comment: taken Manual    Pulse 97    Resp 18    Ht 6' 4\" (1.93 m)    Wt (!) 369 lb 3.2 oz (167.5 kg)    BMI 44.94 kg/m2     Weight Metrics 8/23/2018 8/22/2018 8/15/2018 8/15/2018 8/8/2018 8/8/2018 8/2/2018   Weight - 369 lb 3.2 oz - 374 lb 9.6 oz - 386 lb 3.2 oz -   Waist Measure Inches 58 - 58 - 58 - 59   Exercise Mins/week - - - - 60 - 0   Body Fat % - - - - - - 39.2   BMI - 44.94 kg/m2 - 45.6 kg/m2 - 47.01 kg/m2 -         Have you received any other medical care this week? no  If yes, where and for what? Have you had any change in your medications since your last visit? no  If yes what? Did you have any problems adhering to the program last week? no  If yes, please explain:       Eating Habits Over Last Week:  Did you take in 64 oz of non-caloric fluids? yes     Did you consume your 4 meal replacements each day?  yes       Physical Activity Over the Past Week:    Aerobic exercise: 180 min  Resistance exercise: 0 workouts / week

## 2018-08-24 NOTE — PROGRESS NOTES
"              After Visit Summary   6/6/2017    Teresa Herron    MRN: [id number]           Patient Information     Date Of Birth          1959        Visit Information        Provider Department      6/6/2017 9:45 AM CS NURSE [facility]        Today's Diagnoses     Screening examination for pulmonary tuberculosis    -  1       Follow-ups after your visit        Your next 10 appointments already scheduled     Jun 06, 2017  9:45 AM CDT   Nurse Only with CS NURSE   [facility]    [address]  [place] [id number] [phone]              Who to contact     If you have questions or need follow up information about today's clinic visit or your schedule please contact [facility] directly at [phone].  Normal or non-critical lab and imaging results will be communicated to you by Product Worldhart, letter or phone within 4 business days after the clinic has received the results. If you do not hear from us within 7 days, please contact the clinic through Product Worldhart or phone. If you have a critical or abnormal lab result, we will notify you by phone as soon as possible.  Submit refill requests through Simio or call your pharmacy and they will forward the refill request to us. Please allow 3 business days for your refill to be completed.          Additional Information About Your Visit        Product Worldhart Information     Simio lets you send messages to your doctor, view your test results, renew your prescriptions, schedule appointments and more. To sign up, go to [email or web address] . Click on \"Log in\" on the left side of the screen, which will take you to the Welcome page. Then click on \"Sign up Now\" on the right side of the page.     You will be asked to enter the access code listed below, as well as some personal information. Please follow the directions to create your username and password.     Your access code is: [email or web address]  Expires: 8/17/2017  8:41 " Nurse note from patient's weekly VLCD / LCD / Maintenance class was reviewed. Pertinent medical concerns were:  None noted.      Continue current regimen AM     Your access code will  in 90 days. If you need help or a new code, please call your [facility] clinic or [phone].        Care EveryWhere ID     This is your Care EveryWhere ID. This could be used by other organizations to access your [facility] medical records  [id number]         Blood Pressure from Last 3 Encounters:   16 135/88   16 126/88   10/23/15 119/81    Weight from Last 3 Encounters:   16 148 lb 14.4 oz (67.5 kg)   16 152 lb 1.6 oz (69 kg)   10/23/15 147 lb (66.7 kg)              We Performed the Following     TB INTRADERMAL TEST        Primary Care Provider Office Phone # Fax #    Yoni Baxter [phone]       [facility] [address]  [place] [address]        Thank you!     Thank you for choosing [facility]  for your care. Our goal is always to provide you with excellent care. Hearing back from our patients is one way we can continue to improve our services. Please take a few minutes to complete the written survey that you may receive in the mail after your visit with us. Thank you!             Your Updated Medication List - Protect others around you: Learn how to safely use, store and throw away your medicines at www.disposemymeds.org.          This list is accurate as of: 17  8:06 AM.  Always use your most recent med list.                   Brand Name Dispense Instructions for use    ASPIRIN NOT PRESCRIBED    INTENTIONAL    1 each    1 each continuous prn for other Antiplatelet medication not prescribed intentionally due to Not indicated based on age       hydrochlorothiazide 12.5 MG Tabs tablet     90 tablet    TAKE ONE TABLET BY MOUTH ONE TIME DAILY       lisinopril 20 MG tablet    PRINIVIL/ZESTRIL    90 tablet    Take 1 tablet (20 mg) by mouth daily       simvastatin 40 MG tablet    ZOCOR    90 tablet    Take 1 tablet (40 mg) by mouth At Bedtime       vitamin D 2000 UNITS tablet     90 tablet     TAKE ONE TABLET BY MOUTH ONE TIME DAILY

## 2018-08-29 ENCOUNTER — HOSPITAL ENCOUNTER (OUTPATIENT)
Dept: LAB | Age: 38
Discharge: HOME | End: 2018-08-29
Payer: COMMERCIAL

## 2018-08-29 DIAGNOSIS — E66.01 MORBID OBESITY WITH BODY MASS INDEX OF 40.0-49.9 (HCC): ICD-10-CM

## 2018-08-29 LAB
ALBUMIN SERPL-MCNC: 4.1 G/DL (ref 3.4–5)
ALBUMIN/GLOB SERPL: 1.3 {RATIO} (ref 0.8–1.7)
ALP SERPL-CCNC: 69 U/L (ref 45–117)
ALT SERPL-CCNC: 58 U/L (ref 16–61)
ANION GAP SERPL CALC-SCNC: 9 MMOL/L (ref 3–18)
AST SERPL-CCNC: 24 U/L (ref 15–37)
BILIRUB SERPL-MCNC: 0.5 MG/DL (ref 0.2–1)
BUN SERPL-MCNC: 17 MG/DL (ref 7–18)
BUN/CREAT SERPL: 15 (ref 12–20)
CALCIUM SERPL-MCNC: 9.3 MG/DL (ref 8.5–10.1)
CHLORIDE SERPL-SCNC: 105 MMOL/L (ref 100–108)
CO2 SERPL-SCNC: 28 MMOL/L (ref 21–32)
CREAT SERPL-MCNC: 1.11 MG/DL (ref 0.6–1.3)
GLOBULIN SER CALC-MCNC: 3.1 G/DL (ref 2–4)
GLUCOSE SERPL-MCNC: 81 MG/DL (ref 74–99)
POTASSIUM SERPL-SCNC: 4.4 MMOL/L (ref 3.5–5.5)
PROT SERPL-MCNC: 7.2 G/DL (ref 6.4–8.2)
SODIUM SERPL-SCNC: 142 MMOL/L (ref 136–145)
URATE SERPL-MCNC: 7.8 MG/DL (ref 2.6–7.2)

## 2018-08-29 PROCEDURE — 36415 COLL VENOUS BLD VENIPUNCTURE: CPT | Performed by: NURSE PRACTITIONER

## 2018-08-29 PROCEDURE — 84550 ASSAY OF BLOOD/URIC ACID: CPT | Performed by: NURSE PRACTITIONER

## 2018-08-29 PROCEDURE — 80053 COMPREHEN METABOLIC PANEL: CPT | Performed by: NURSE PRACTITIONER

## 2018-08-30 ENCOUNTER — OFFICE VISIT (OUTPATIENT)
Dept: INTERNAL MEDICINE CLINIC | Age: 38
End: 2018-08-30

## 2018-08-30 VITALS
HEART RATE: 97 BPM | HEIGHT: 76 IN | SYSTOLIC BLOOD PRESSURE: 132 MMHG | RESPIRATION RATE: 18 BRPM | DIASTOLIC BLOOD PRESSURE: 78 MMHG | OXYGEN SATURATION: 99 % | BODY MASS INDEX: 38.36 KG/M2 | TEMPERATURE: 99.6 F | WEIGHT: 315 LBS

## 2018-08-30 DIAGNOSIS — E66.01 MORBID OBESITY WITH BODY MASS INDEX OF 40.0-49.9 (HCC): Primary | ICD-10-CM

## 2018-08-30 DIAGNOSIS — K21.9 GASTROESOPHAGEAL REFLUX DISEASE, ESOPHAGITIS PRESENCE NOT SPECIFIED: ICD-10-CM

## 2018-08-30 DIAGNOSIS — I10 ESSENTIAL HYPERTENSION: ICD-10-CM

## 2018-08-30 NOTE — PROGRESS NOTES
Chief Complaint   Patient presents with    Weight Management     1. Have you been to the ER, urgent care clinic since your last visit? Hospitalized since your last visit? No    2. Have you seen or consulted any other health care providers outside of the [address] [place] [address] since your last visit? Include any pap smears or colon screening.  No

## 2018-08-30 NOTE — PROGRESS NOTES
[facility] Progress Note:   F/up Physician Visit    CC: Obesity      Kwadwo Catalan is a 34 y.o. male who is here for his  f/up physician visit for the VLCD Program. Deloris Coyne has completed 4 weeks of the program to date. 20 lbs weight loss, feeling well. Walk/running 4-5 days/week without discomfort, fatigue or hunger. Was having HTN issues, which is now well controlled. Weight History  starting weight 379.2lb Body mass index is 46.16 kg/(m^2). Current weight 359 lbs  Goal weight 280 lb  Highest weight 389 lb, at 34years old    Weight Metrics 8/30/2018 8/30/2018 8/23/2018 8/22/2018 8/15/2018 8/15/2018 8/8/2018   Weight - 359 lb 12.8 oz - 369 lb 3.2 oz - 374 lb 9.6 oz -   Waist Measure Inches 57 - 58 - 58 - 58   Exercise Mins/week 120 - - - - - 60   Body Fat % 39.2 - - - - - -   BMI - 43.8 kg/m2 - 44.94 kg/m2 - 45.6 kg/m2 -         Current Outpatient Prescriptions   Medication Sig Dispense Refill    verapamil ER (VERELAN) 240 mg ER capsule Take 1 Cap by mouth daily. 30 Cap 2         Participation   Did you attend clinic and class last week? yes    Review of Systems  Since your last visit, have you experienced any complications? no  If yes, please list:     No CP, SOB, palpitations, lightheadedness, dizziness, constipation. Positives highlight in BOLD. Are you taking an appetite suppressant? no  If so, is there any Chest Pain, Palpitations or Dizziness? BP Readings from Last 10 Encounters:   08/30/18 132/78   08/23/18 130/80   08/15/18 140/90   08/08/18 170/90   08/02/18 (!) 144/102   01/15/18 (!) 148/95   05/02/16 (!) 140/98   02/02/16 120/80   01/19/16 142/88   01/15/16 140/60         Have you received any other medical care this week? no  If yes, where and for what? Have you discontinued or changed any medicine or dose of your medicine since your last visit? no  If yes, where and for what?          Diet  How many ounces of calorie-free liquids did you consume each day?  60-72 oz at times more    How many meal replacements did you take each day? 4    Did you have any problems adhering to the program?  no If yes, please explain:       Exercise  Aerobic exercise: 120 min  Resistance exercise: 0 workouts / week  Any discomfort?  no     If yes, where? Review of Systems  Complete ROS negative except where noted above    Objective  Visit Vitals    /78 (BP 1 Location: Left arm, BP Patient Position: Sitting)  Comment (BP 1 Location): manual    Pulse 97    Temp 99.6 °F (37.6 °C) (Oral)    Resp 18    Ht 6' 4\" (1.93 m)    Wt (!) 359 lb 12.8 oz (163.2 kg)    SpO2 99%    BMI 43.8 kg/m2     No LMP for male patient. PHQ over the last two weeks 8/2/2018   Little interest or pleasure in doing things Not at all   Feeling down, depressed, irritable, or hopeless Not at all   Total Score PHQ 2 0         Waist Circumference: I personally reviewed patient's Weight Management Doc Flowsheet  Neck Circumference: I personally reviewed patient's Weight Management Doc Flowsheet  Percent Body Fat: I personally reviewed patient's Weight Management Doc Flowsheet    Physical Exam  Appearance: well appearing, obese, A&O, NAD  HEENT:  NC/AT, PERRL, No scleral icterus  Heart:  RRR without M/R/G  Lungs:  CTAB, no rhonchi, rales, or wheezes with good air exchange   Ext:  No LE Edema    Assessment / Plan    Encounter Diagnoses   Name Primary?  Morbid obesity with body mass index of 40.0-49.9 ([facility]) Yes    Gastroesophageal reflux disease, esophagitis presence not specified     Essential hypertension        1. Weight management well controlled   Progress was reviewed with patient    2. Labs    Latest results reviewed with patient   Lab slip given to pt for f/up HDL labs    3.  Diet regimen   # of meal replacements prescribed: 4   If modified LCD-nutritional guidelines:    Monthly Goal   As below    Medical monitoring schedule:   Weekly BP/Weight checks   Monthly provider appointments  I have reviewed/discussed the above normal BMI with the patient. I have recommended the following interventions: dietary management education, guidance, and counseling, encourage exercise, monitor weight and prescribed dietary intake . .      Mr. Julio César Awan has a reminder for a \"due or due soon\" health maintenance. I have asked that he contact his primary care provider for follow-up on this health maintenance. Patient Instructions     Health Maintenance Due   Topic Date Due    DTaP/Tdap/Td series (1 - Tdap) 02/07/1999    Influenza Age 5 to Adult  08/01/2018     Monthly goal:    15-20 lbs weight loss         Body Mass Index: Care Instructions  Your Care Instructions    Body mass index (BMI) can help you see if your weight is raising your risk for health problems. It uses a formula to compare how much you weigh with how tall you are. · A BMI lower than 18.5 is considered underweight. · A BMI between 18.5 and 24.9 is considered healthy. · A BMI between 25 and 29.9 is considered overweight. A BMI of 30 or higher is considered obese. If your BMI is in the normal range, it means that you have a lower risk for weight-related health problems. If your BMI is in the overweight or obese range, you may be at increased risk for weight-related health problems, such as high blood pressure, heart disease, stroke, arthritis or joint pain, and diabetes. If your BMI is in the underweight range, you may be at increased risk for health problems such as fatigue, lower protection (immunity) against illness, muscle loss, bone loss, hair loss, and hormone problems. BMI is just one measure of your risk for weight-related health problems. You may be at higher risk for health problems if you are not active, you eat an unhealthy diet, or you drink too much alcohol or use tobacco products. Follow-up care is a key part of your treatment and safety.  Be sure to make and go to all appointments, and call your doctor if you are having problems. It's also a good idea to know your test results and keep a list of the medicines you take. How can you care for yourself at home? · Practice healthy eating habits. This includes eating plenty of fruits, vegetables, whole grains, lean protein, and low-fat dairy. · If your doctor recommends it, get more exercise. Walking is a good choice. Bit by bit, increase the amount you walk every day. Try for at least 30 minutes on most days of the week. · Do not smoke. Smoking can increase your risk for health problems. If you need help quitting, talk to your doctor about stop-smoking programs and medicines. These can increase your chances of quitting for good. · Limit alcohol to 2 drinks a day for men and 1 drink a day for women. Too much alcohol can cause health problems. If you have a BMI higher than 25  · Your doctor may do other tests to check your risk for weight-related health problems. This may include measuring the distance around your waist. A waist measurement of more than 40 inches in men or 35 inches in women can increase the risk of weight-related health problems. · Talk with your doctor about steps you can take to stay healthy or improve your health. You may need to make lifestyle changes to lose weight and stay healthy, such as changing your diet and getting regular exercise. If you have a BMI lower than 18.5  · Your doctor may do other tests to check your risk for health problems. · Talk with your doctor about steps you can take to stay healthy or improve your health. You may need to make lifestyle changes to gain or maintain weight and stay healthy, such as getting more healthy foods in your diet and doing exercises to build muscle. Where can you learn more? Go to http://dez-jasmyn.info/. Enter S176 in the search box to learn more about \"Body Mass Index: Care Instructions. \"  Current as of: October 13, 2016  Content Version: 11.4  © 9656-5212 Healthwise, [employer]. Care instructions adapted under license by [employer] (which disclaims liability or warranty for this information). If you have questions about a medical condition or this instruction, always ask your healthcare professional. [address] any warranty or liability for your use of this information. Follow-up Disposition: Not on File      10 minutes of the 15 minutes face to face time with Rolf Anand consisted of counseling & coordinating and/or discussing treatment plans in reference to his The primary encounter diagnosis was Morbid obesity with body mass index of 40.0-49.9 ([facility] [address] Diagnoses of Gastroesophageal reflux disease, esophagitis presence not specified and Essential hypertension were also pertinent to this visit. The patient is to follow up as scheduled and will report to the ED or the office if symptoms change or increase. The patient has voiced understanding and will comply.

## 2018-08-30 NOTE — MR AVS SNAPSHOT
[address] [facility] 
 
 
 [address], [address] [facility] 
[phone] Patient: Daniel Hutchins MRN: [id number] QRI:[id number] Visit Information Date & Time Provider Department Dept. Phone Encounter #  
 8/30/2018  9:00 AM Amy Medrano NP [facility] [address] [place] [address] [phone] [id number] Your Appointments 8/31/2018 10:00 AM  
New Patient with Manuel Garza MD  
[facility] [address] [place] [address] [facility]) Appt Note: nppe establish care [address], [address] [place] [address] [address] [place]  
  
   
 [address] [facility], [address] Upcoming Health Maintenance Date Due DTaP/Tdap/Td series (1 - Tdap) 2/7/1999 Influenza Age 5 to Adult 8/1/2018 Allergies as of 8/30/2018  Review Complete On: 8/30/2018 By: Amy Medrano NP Severity Noted Reaction Type Reactions Lozol [Indapamide] High 08/02/2018    Other (comments) Severe cramping in stomach area Current Immunizations  Never Reviewed No immunizations on file. Not reviewed this visit You Were Diagnosed With   
  
 Codes Comments Morbid obesity with body mass index of 40.0-49.9 (HCC)    -  Primary ICD-10-CM: E66.01 
ICD-9-CM: 278.01 Gastroesophageal reflux disease, esophagitis presence not specified     ICD-10-CM: K21.9 ICD-9-CM: 530.81 Essential hypertension     ICD-10-CM: I10 
ICD-9-CM: 401.9 Vitals BP Pulse Temp Resp Height(growth percentile) Weight(growth percentile) 132/78 (BP 1 Location: Left arm, BP Patient Position: Sitting) 97 99.6 °F (37.6 °C) (Oral) 18 6' 4\" (1.93 m) (!) 359 lb 12.8 oz (163.2 kg) SpO2 BMI Smoking Status 99% 43.8 kg/m2 Never Smoker BMI and BSA Data Body Mass Index Body Surface Area  
 43.8 kg/m 2 2.96 m 2 Preferred Pharmacy Pharmacy Name Phone  [facility] [address] [facility] [phone] Your Updated Medication List  
  
   
This list is accurate as of 8/30/18  9:36 AM.  Always use your most recent med list.  
  
  
  
  
 verapamil  mg ER capsule Commonly known as:  Governor Alvarez Take 1 Cap by mouth daily. To-Do List   
 08/30/2018 Lab:  METABOLIC PANEL, COMPREHENSIVE   
  
 08/30/2018 Lab:  URIC ACID Patient Instructions Health Maintenance Due Topic Date Due  
 DTaP/Tdap/Td series (1 - Tdap) 02/07/1999  Influenza Age 5 to Adult  08/01/2018 Monthly goal: 
 
15-20 lbs weight loss Body Mass Index: Care Instructions Your Care Instructions Body mass index (BMI) can help you see if your weight is raising your risk for health problems. It uses a formula to compare how much you weigh with how tall you are. · A BMI lower than 18.5 is considered underweight. · A BMI between 18.5 and 24.9 is considered healthy. · A BMI between 25 and 29.9 is considered overweight. A BMI of 30 or higher is considered obese. If your BMI is in the normal range, it means that you have a lower risk for weight-related health problems. If your BMI is in the overweight or obese range, you may be at increased risk for weight-related health problems, such as high blood pressure, heart disease, stroke, arthritis or joint pain, and diabetes. If your BMI is in the underweight range, you may be at increased risk for health problems such as fatigue, lower protection (immunity) against illness, muscle loss, bone loss, hair loss, and hormone problems. BMI is just one measure of your risk for weight-related health problems. You may be at higher risk for health problems if you are not active, you eat an unhealthy diet, or you drink too much alcohol or use tobacco products. Follow-up care is a key part of your treatment and safety.  Be sure to make and go to all appointments, and call your doctor if you are having problems. It's also a good idea to know your test results and keep a list of the medicines you take. How can you care for yourself at home? · Practice healthy eating habits. This includes eating plenty of fruits, vegetables, whole grains, lean protein, and low-fat dairy. · If your doctor recommends it, get more exercise. Walking is a good choice. Bit by bit, increase the amount you walk every day. Try for at least 30 minutes on most days of the week. · Do not smoke. Smoking can increase your risk for health problems. If you need help quitting, talk to your doctor about stop-smoking programs and medicines. These can increase your chances of quitting for good. · Limit alcohol to 2 drinks a day for men and 1 drink a day for women. Too much alcohol can cause health problems. If you have a BMI higher than 25 · Your doctor may do other tests to check your risk for weight-related health problems. This may include measuring the distance around your waist. A waist measurement of more than 40 inches in men or 35 inches in women can increase the risk of weight-related health problems. · Talk with your doctor about steps you can take to stay healthy or improve your health. You may need to make lifestyle changes to lose weight and stay healthy, such as changing your diet and getting regular exercise. If you have a BMI lower than 18.5 · Your doctor may do other tests to check your risk for health problems. · Talk with your doctor about steps you can take to stay healthy or improve your health. You may need to make lifestyle changes to gain or maintain weight and stay healthy, such as getting more healthy foods in your diet and doing exercises to build muscle. Where can you learn more? Go to http://dez-jasmyn.info/. Enter S176 in the search box to learn more about \"Body Mass Index: Care Instructions. \" Current as of: October 13, 2016 Content Version: 11.4 © 8781-8151 Healthwise, Inc[employer]. Care instructions adapted under license by [employer] (which disclaims liability or warranty for this information). If you have questions about a medical condition or this instruction, always ask your healthcare professional. [address] any warranty or liability for your use of this information. Introducing [facility]! Deasilvia Gutierrez: Thank you for requesting a goDog Fetch account. Our records indicate that you already have an active goDog Fetch account. You can access your account anytime at [email or web address] Did you know that you can access your hospital and ER discharge instructions at any time in goDog Fetch? You can also review all of your test results from your hospital stay or ER visit. Additional Information If you have questions, please visit the Frequently Asked Questions section of the goDog Fetch website at [email or web address] Remember, goDog Fetch is NOT to be used for urgent needs. For medical emergencies, dial 911. Now available from your iPhone and Android! Please provide this summary of care documentation to your next provider. Your primary care clinician is listed as Malu Herrera. If you have any questions after today's visit, please call [phone].

## 2018-08-30 NOTE — PATIENT INSTRUCTIONS
Health Maintenance Due   Topic Date Due    DTaP/Tdap/Td series (1 - Tdap) 02/07/1999    Influenza Age 5 to Adult  08/01/2018     Monthly goal:    15-20 lbs weight loss         Body Mass Index: Care Instructions  Your Care Instructions    Body mass index (BMI) can help you see if your weight is raising your risk for health problems. It uses a formula to compare how much you weigh with how tall you are. · A BMI lower than 18.5 is considered underweight. · A BMI between 18.5 and 24.9 is considered healthy. · A BMI between 25 and 29.9 is considered overweight. A BMI of 30 or higher is considered obese. If your BMI is in the normal range, it means that you have a lower risk for weight-related health problems. If your BMI is in the overweight or obese range, you may be at increased risk for weight-related health problems, such as high blood pressure, heart disease, stroke, arthritis or joint pain, and diabetes. If your BMI is in the underweight range, you may be at increased risk for health problems such as fatigue, lower protection (immunity) against illness, muscle loss, bone loss, hair loss, and hormone problems. BMI is just one measure of your risk for weight-related health problems. You may be at higher risk for health problems if you are not active, you eat an unhealthy diet, or you drink too much alcohol or use tobacco products. Follow-up care is a key part of your treatment and safety. Be sure to make and go to all appointments, and call your doctor if you are having problems. It's also a good idea to know your test results and keep a list of the medicines you take. How can you care for yourself at home? · Practice healthy eating habits. This includes eating plenty of fruits, vegetables, whole grains, lean protein, and low-fat dairy. · If your doctor recommends it, get more exercise. Walking is a good choice. Bit by bit, increase the amount you walk every day.  Try for at least 30 minutes on most days of the week. · Do not smoke. Smoking can increase your risk for health problems. If you need help quitting, talk to your doctor about stop-smoking programs and medicines. These can increase your chances of quitting for good. · Limit alcohol to 2 drinks a day for men and 1 drink a day for women. Too much alcohol can cause health problems. If you have a BMI higher than 25  · Your doctor may do other tests to check your risk for weight-related health problems. This may include measuring the distance around your waist. A waist measurement of more than 40 inches in men or 35 inches in women can increase the risk of weight-related health problems. · Talk with your doctor about steps you can take to stay healthy or improve your health. You may need to make lifestyle changes to lose weight and stay healthy, such as changing your diet and getting regular exercise. If you have a BMI lower than 18.5  · Your doctor may do other tests to check your risk for health problems. · Talk with your doctor about steps you can take to stay healthy or improve your health. You may need to make lifestyle changes to gain or maintain weight and stay healthy, such as getting more healthy foods in your diet and doing exercises to build muscle. Where can you learn more? Go to http://dez-jasmyn.info/. Enter S176 in the search box to learn more about \"Body Mass Index: Care Instructions. \"  Current as of: October 13, 2016  Content Version: 11.4  © 2358-4928 Healthwise, Incorporated. Care instructions adapted under license by [employer] (which disclaims liability or warranty for this information). If you have questions about a medical condition or this instruction, always ask your healthcare professional. [address] any warranty or liability for your use of this information.

## 2018-08-31 ENCOUNTER — OFFICE VISIT (OUTPATIENT)
Dept: INTERNAL MEDICINE CLINIC | Age: 38
End: 2018-08-31

## 2018-08-31 VITALS
RESPIRATION RATE: 18 BRPM | HEIGHT: 76 IN | HEART RATE: 72 BPM | DIASTOLIC BLOOD PRESSURE: 76 MMHG | OXYGEN SATURATION: 98 % | BODY MASS INDEX: 38.36 KG/M2 | TEMPERATURE: 98.2 F | SYSTOLIC BLOOD PRESSURE: 130 MMHG | WEIGHT: 315 LBS

## 2018-08-31 DIAGNOSIS — R06.83 SNORING: ICD-10-CM

## 2018-08-31 DIAGNOSIS — E66.01 MORBID OBESITY (HCC): ICD-10-CM

## 2018-08-31 DIAGNOSIS — I10 ESSENTIAL HYPERTENSION: Primary | ICD-10-CM

## 2018-08-31 DIAGNOSIS — E55.9 VITAMIN D DEFICIENCY: ICD-10-CM

## 2018-08-31 PROBLEM — K75.81 NASH (NONALCOHOLIC STEATOHEPATITIS): Status: ACTIVE | Noted: 2018-08-31

## 2018-08-31 PROBLEM — E79.0 HYPERURICEMIA: Status: ACTIVE | Noted: 2018-08-31

## 2018-08-31 RX ORDER — VERAPAMIL HYDROCHLORIDE 240 MG/1
240 CAPSULE, EXTENDED RELEASE ORAL DAILY
Qty: 30 CAP | Refills: 6 | Status: SHIPPED | OUTPATIENT
Start: 2018-08-31 | End: 2018-11-15 | Stop reason: SDUPTHER

## 2018-08-31 RX ORDER — ERGOCALCIFEROL 1.25 MG/1
50000 CAPSULE ORAL
Qty: 4 CAP | Refills: 3 | Status: SHIPPED | OUTPATIENT
Start: 2018-08-31 | End: 2018-10-25

## 2018-08-31 NOTE — PATIENT INSTRUCTIONS
Patient was given a copy of the Advanced Medical Directive form and understands to bring it in once completed. Health Maintenance Due Topic Date Due  
 DTaP/Tdap/Td series (1 - Tdap) 02/07/1999  Influenza Age 5 to Adult  08/01/2018 Fluticasone (Into the nose) Fluticasone (dyhk-YPI-y-sone) Treats allergy symptoms, such as runny or stuffy nose. This medicine is a corticosteroid. Brand Name(s): Children's Flonase, ClariSpray, DermacinRx [place], DermacinRx [place], [address] [place] [address], Flonase Sensimist, Fluticasone Propionate Novaplus, Ticaspray, Veramyst  
There may be other brand names for this medicine. When This Medicine Should Not Be Used: This medicine is not right for everyone. Do not use if you had an allergic reaction to fluticasone. How to Use This Medicine:  
Spray · Your doctor will tell you how much medicine to use. Do not use more than directed. · This medicine is for use only in the nose. Do not get any of it in your eyes or on your skin. If it does get on these areas, rinse it off right away. · Prime the spray: Release 6 test sprays into the air away from the face, or pump the bottle until some of the medicine sprays out. Now it is ready to use. Prime the spray if it has not been used for more than 7 days (or 30 days for Veramyst®) or if the cap has been left off the bottle for 5 days or longer. · Shake the medicine well just before each use. · Before using the medicine, gently blow your nose to clear the nostrils. · After using the nasal spray, wipe the tip of the bottle with a clean tissue and put the cap back on. · You may need to use this medicine for a few days before you start to feel better. · Read and follow the patient instructions that come with this medicine. Talk to your doctor or pharmacist if you have any questions. · Follow the instructions on the medicine label if you are using this medicine without a prescription. · Missed dose: Take a dose as soon as you remember. If it is almost time for your next dose, wait until then and take a regular dose. Do not take extra medicine to make up for a missed dose. · Keep the bottle tightly closed when not using it. Store at room temperature, away from heat and direct light. Do not freeze or refrigerate. Throw this medicine away after you use 120 sprays. Drugs and Foods to Avoid: Ask your doctor or pharmacist before using any other medicine, including over-the-counter medicines, vitamins, and herbal products. · Do not use this medicine together with ritonavir. · Some foods and medicines can affect how fluticasone works. Tell your doctor if you are using ketoconazole. Warnings While Using This Medicine: · Tell your doctor if you are pregnant or breastfeeding, or if you have liver disease, asthma, an infection, or a history of cataracts or glaucoma. Make sure your doctor knows if you have had nose surgery, a nose injury, or a recent infection in your nose. · This medicine may cause the following problems: 
¨ Holes or ulcers inside the nose ¨ Slow wound healing ¨ Cataracts or glaucoma ¨ Problems with the adrenal glands ¨ Slow growth in children · Avoid people who are sick or have infections. Tell your doctor right away if you think you have been exposed to measles or chickenpox. · Your doctor will check your progress and the effects of this medicine at regular visits. Keep all appointments. · Call your doctor if your symptoms do not improve or if they get worse. · Keep all medicine out of the reach of children. Never share your medicine with anyone. Possible Side Effects While Using This Medicine:  
Call your doctor right away if you notice any of these side effects: · Allergic reaction: Itching or hives, swelling in your face or hands, swelling or tingling in your mouth or throat, chest tightness, trouble breathing · Burning, redness, swelling, or irritation around or inside your nose · Eye pain or vision changes · Fever, chills, cough, sore throat, and body aches · Heavy nosebleeds · Sores or white patches inside the nose or mouth · Tiredness, weakness, dizziness If you notice other side effects that you think are caused by this medicine, tell your doctor. Call your doctor for medical advice about side effects. You may report side effects to FDA at 3-872-HAS-2067 © 2017 [address] Information is for End User's use only and may not be sold, redistributed or otherwise used for commercial purposes. The above information is an  only. It is not intended as medical advice for individual conditions or treatments. Talk to your doctor, nurse or pharmacist before following any medical regimen to see if it is safe and effective for you.

## 2018-08-31 NOTE — PROGRESS NOTES
Chief Complaint Patient presents with [facility]  Hypertension Patient was given a copy of the Advanced Medical Directive form and understands to bring it in once completed. 1. Have you been to the ER, urgent care clinic since your last visit? Hospitalized since your last visit? No 
 
2. Have you seen or consulted any other health care providers outside of the [address] [place] [address] since your last visit? Include any pap smears or colon screening.  No

## 2018-08-31 NOTE — PROGRESS NOTES
[facility]: 
8/31/2018, MRN: [id number] Dg Guo is a 34 y.o. male and presents to clinic to Alexis Castellanos and Hypertension Subjective: The patient is a 42-year-old male with history of CONDE, hyperuricemia, vitamin D deficiency, obesity, GERD, and hypertension with white coat hypertension. 1. Hypertension, Snoring, and Obesity: Present off and on for years. He states he has had blood pressure issues since his college years. He has a history of playing sports. He also has a family history of hypertension. His blood pressure was elevated while participating in the medically supervised weight loss program.  As result, he was placed on verapamil. In the past, he was placed on indapamide but was unable to tolerate his medication as it caused fatigue and photosensitivity. He is tolerating verapamil well without any adverse side effects. His blood pressure today is 130/76. He also has a history of snoring per other family members. He is adhering to the recommended bariatric nonsurgical diet. His weight is down to 362 pounds. No excessive alcoholic beverage consumption. No tobacco use. No energy drink consumption. No drug use. 2.  Vitamin D Deficiency: His most recent lab results showed a low vitamin D level. He is not taking vitamin D over-the-counter. Patient Active Problem List  
 Diagnosis Date Noted  CONDE (nonalcoholic steatohepatitis) 08/31/2018  Hyperuricemia 08/31/2018  Vitamin D deficiency 08/31/2018  Morbid obesity ([address]  GERD (gastroesophageal reflux disease)  Hypertension 12/01/2015 Current Outpatient Prescriptions Medication Sig Dispense Refill  ergocalciferol (ERGOCALCIFEROL) 50,000 unit capsule Take 1 Cap by mouth every seven (7) days. 4 Cap 3  verapamil ER (VERELAN) 240 mg ER capsule Take 1 Cap by mouth daily. 30 Cap 6 Allergies Allergen Reactions  Lozol [Indapamide] Other (comments) Severe cramping in stomach area Past Medical History:  
Diagnosis Date  GERD (gastroesophageal reflux disease) uses OTC meds  Hypertension   
 no meds as of Jan 2018  Morbid obesity ([address]  Morbid obesity with body mass index of 40.0-49.9 ([facility]) Past Surgical History:  
Procedure Laterality Date  HX KNEE ARTHROSCOPY    
 HX ORTHOPAEDIC    
 ankle surgery / no hardware  HX TONSILLECTOMY Family History Problem Relation Age of Onset  Cancer Mother  Hypertension Mother  Diabetes Mother  Diabetes Father  Hypertension Father Social History Substance Use Topics  Smoking status: Never Smoker  Smokeless tobacco: Never Used  Alcohol use No  
 
 
ROS Review of Systems Constitutional: Negative for chills and fever. HENT: Negative for ear pain and sore throat. Eyes: Negative for blurred vision and pain. Respiratory: Negative for cough and shortness of breath. Cardiovascular: Negative for chest pain. Gastrointestinal: Negative for abdominal pain, blood in stool and melena. Genitourinary: Negative for dysuria and hematuria. Musculoskeletal: Negative for joint pain and myalgias. Skin: Negative for rash. Neurological: Negative for tingling, focal weakness and headaches. Endo/Heme/Allergies: Does not bruise/bleed easily. Psychiatric/Behavioral: Negative for substance abuse. Objective Vitals:  
 08/31/18 1015 BP: 130/76 Pulse: 72 Resp: 18 Temp: 98.2 °F (36.8 °C) TempSrc: Oral  
SpO2: 98% Weight: (!) 362 lb 3.2 oz (164.3 kg) Height: 6' 4\" (1.93 m) PainSc:   0 - No pain Physical Exam  
Constitutional: He is oriented to person, place, and time and well-developed, well-nourished, and in no distress. HENT:  
Head: Normocephalic and atraumatic.   
Right Ear: External ear normal.  
Left Ear: External ear normal.  
Nose: Nose normal.  
 Mouth/Throat: Oropharynx is clear and moist. No oropharyngeal exudate. Clear TMs Eyes: Conjunctivae and EOM are normal. Pupils are equal, round, and reactive to light. Right eye exhibits no discharge. Left eye exhibits no discharge. No scleral icterus. Neck: Neck supple. Cardiovascular: Normal rate, regular rhythm, normal heart sounds and intact distal pulses. Pulmonary/Chest: Effort normal and breath sounds normal. No respiratory distress. He has no wheezes. He has no rales. Abdominal: Soft. Bowel sounds are normal. He exhibits no distension. There is no tenderness. There is no rebound and no guarding. Musculoskeletal: He exhibits no edema or tenderness ([place]). Lymphadenopathy:  
  He has no cervical adenopathy. Neurological: He is alert and oriented to person, place, and time. He exhibits normal muscle tone. Gait normal.  
Skin: Skin is warm and dry. No erythema. Psychiatric: Affect normal.  
Nursing note and vitals reviewed. LABS Data Review:  
Lab Results Component Value Date/Time WBC 7.0 07/25/2018 12:00 AM  
 HGB 13.9 07/25/2018 12:00 AM  
 HCT 42.7 07/25/2018 12:00 AM  
 PLATELET 815 40/90/8979 12:00 AM  
 MCV 82 07/25/2018 12:00 AM  
 
 
Lab Results Component Value Date/Time Sodium 142 08/29/2018 08:19 AM  
 Potassium 4.4 08/29/2018 08:19 AM  
 Chloride 105 08/29/2018 08:19 AM  
 CO2 28 08/29/2018 08:19 AM  
 Anion gap 9 08/29/2018 08:19 AM  
 Glucose 81 08/29/2018 08:19 AM  
 BUN 17 08/29/2018 08:19 AM  
 Creatinine 1.11 08/29/2018 08:19 AM  
 BUN/Creatinine ratio 15 08/29/2018 08:19 AM  
 GFR est AA >60 08/29/2018 08:19 AM  
 GFR est non-AA >60 08/29/2018 08:19 AM  
 Calcium 9.3 08/29/2018 08:19 AM  
 
 
Lab Results Component Value Date/Time  Cholesterol, total 166 07/25/2018 12:00 AM  
 HDL Cholesterol 59 07/25/2018 12:00 AM  
 LDL, calculated 94 07/25/2018 12:00 AM  
 VLDL, calculated 13 07/25/2018 12:00 AM  
 Triglyceride 64 07/25/2018 12:00 AM  
 CHOL/HDL Ratio 4.0 01/19/2016 10:19 AM  
 
 
Lab Results Component Value Date/Time Hemoglobin A1c 5.6 07/25/2018 12:00 AM  
 
 
Assessment/Plan: 1. Snoring and HTN: Stable. He has HTN with white coat hypertension. +Losing weight.  
- Ok to c/w verapamil. Refilling verapamil. 
- Referral to Sleep Medicine to r/o HERACLIO. I encouraged him to continue participating in the medically supervised weight loss program.  I encouraged him to continue her weight loss journey, adhering to the bariatric nonsurgical diet plan. I will recheck his weight at his follow-up appointment. Bal Ingramt his history of whitecoat hypertension in addition to his benign essential hypertension, I discussed how he should always have a manual blood pressure taken after he has been sitting down for several minutes. His blood pressure initially today was elevated. It came down to 130/76 with just sitting for several minutes We discussed the potential side effects of poorly controlled hypertension. I discussed his goal of less than 140/90. ORDERS: 
- SLEEP MEDICINE REFERRAL 
- verapamil ER (VERELAN) 240 mg ER capsule; Take 1 Cap by mouth daily. Dispense: 30 Cap; Refill: 6 
 
2. Vitamin D Deficiency: Stable - Ordering prescription strength vitamin D. I instructed him to take OTC vitamin D after completing the prescription strength vitamin D. 
 
ORDERS: 
- ergocalciferol (ERGOCALCIFEROL) 50,000 unit capsule; Take 1 Cap by mouth every seven (7) days. Dispense: 4 Cap; Refill: 3 3. General: 
I offered to have him come back for the flu vaccine. He declined getting this vaccination but will let me know if he changes his mind. Health Maintenance Due Topic Date Due  
 DTaP/Tdap/Td series (1 - Tdap) 02/07/1999  Influenza Age 5 to Adult  08/01/2018 Lab review: labs are reviewed in the EHR I have discussed the diagnosis with the patient and the intended plan as seen in the above orders. The patient has received an after-visit summary and questions were answered concerning future plans. I have discussed medication side effects and warnings with the patient as well. I have reviewed the plan of care with the patient, accepted their input and they are in agreement with the treatment goals. All questions were answered. The patient understands the plan of care. Handouts provided today with above information. Pt instructed if symptoms worsen to call the office or report to the ED for continued care. Greater than 50% of the visit time was spent in counseling and/or coordination of care. Voice recognition was used to generate this report, which may have resulted in some phonetic based errors in grammar and contents. Even though attempts were made to correct all the mistakes, some may have been missed, and remained in the body of the document. Follow-up Disposition: 
Return in about 5 months (around 1/28/2019).  
 
Gisel Mojica MD

## 2018-08-31 NOTE — MR AVS SNAPSHOT
[facility] 
 
 
 [address] [facility], [address] [facility] 
[phone] Patient: Paulo Ely MRN: [id number] Visit Information Date & Time Provider Department Dept. Phone Encounter #  
 8/31/2018 10:00 AM Velma Marshall MD [facility] [phone] [id number] Follow-up Instructions Return in about 5 months (around 1/28/2019). Your Appointments 2/4/2019 10:30 AM  
Office Visit with Velma Marshall MD  
[facility]) Appt Note: 5 month f/u  
 [address] [facility] - [place] [address] [id number] [place] [address]  
  
   
 [address], [address] Upcoming Health Maintenance Date Due DTaP/Tdap/Td series (1 - Tdap) 2/7/1999 Influenza Age 5 to Adult 8/1/2018 Allergies as of 8/31/2018  Review Complete On: 8/31/2018 By: Velma Marshall MD  
  
 Severity Noted Reaction Type Reactions Lozol [Indapamide] High 08/02/2018    Other (comments) Severe cramping in stomach area Current Immunizations  Never Reviewed No immunizations on file. Not reviewed this visit You Were Diagnosed With   
  
 Codes Comments Essential hypertension    -  Primary ICD-10-CM: I10 
ICD-9-CM: 401.9 Snoring     ICD-10-CM: R06.83 
ICD-9-CM: 786.09 Vitamin D deficiency     ICD-10-CM: E55.9 ICD-9-CM: 268.9 Vitals BP Pulse Temp Resp Height(growth percentile) Weight(growth percentile) 130/76 (BP 1 Location: Left arm, BP Patient Position: Sitting) 72 98.2 °F (36.8 °C) (Oral) 18 6' 4\" (1.93 m) (!) 362 lb 3.2 oz (164.3 kg) SpO2 BMI Smoking Status 98% 44.09 kg/m2 Never Smoker BMI and BSA Data Body Mass Index Body Surface Area 44.09 kg/m 2 2.97 m 2 Preferred Pharmacy Pharmacy Name Phone [facility] [address] [facility] [place], [address] [facility] [phone] Your Updated Medication List  
  
   
This list is accurate as of 8/31/18 11:06 AM.  Always use your most recent med list.  
  
  
  
  
 ergocalciferol 50,000 unit capsule Commonly known as:  ERGOCALCIFEROL Take 1 Cap by mouth every seven (7) days. verapamil  mg ER capsule Commonly known as:  Eugene Selvin Take 1 Cap by mouth daily. Prescriptions Sent to Pharmacy Refills  
 ergocalciferol (ERGOCALCIFEROL) 50,000 unit capsule 3 Sig: Take 1 Cap by mouth every seven (7) days. Class: Normal  
 [facility] [address] [place] [address] [address] [facility] Ph #: [phone] Route: Oral  
 verapamil ER (VERELAN) 240 mg ER capsule 6 Sig: Take 1 Cap by mouth daily. Class: Normal  
 Pharmacy: [facility] [address] MAHENDRA GONZALEZ [place] [address] [address] [facility] Ph #: [phone] Route: Oral  
  
We Performed the Following SLEEP MEDICINE REFERRAL [[id number] Custom] Comments:  
 Orders: 
Sleep Medicine Consult - Schedule patient for a sleep specialist consult. If appropriate, schedule patient for sleep study(s). Initiate treatment if needed. Forward correspondance to my office. Follow-up Instructions Return in about 5 months (around 1/28/2019). Referral Information Referral ID Referred By Referred To  
  
 [id number] Laura Gonzalez Not Available Visits Status Start Date End Date 1 New Request 8/31/18 8/31/19 If your referral has a status of pending review or denied, additional information will be sent to support the outcome of this decision. Patient Instructions Patient was given a copy of the Advanced Medical Directive form and understands to bring it in once completed. Health Maintenance Due Topic Date Due  
 DTaP/Tdap/Td series (1 - Tdap) 02/07/1999  Influenza Age 5 to Adult  08/01/2018 Fluticasone (Into the nose) Fluticasone (sjxe-HTL-x-sone) Treats allergy symptoms, such as runny or stuffy nose. This medicine is a corticosteroid. Brand Name(s): Children's Flonase, ClariSpray, DermacinRx [place], DermacinRx [place], [address] [place] [address], Flonase Sensimist, Fluticasone Propionate Novaplus, Ticaspray, Veramyst  
There may be other brand names for this medicine. When This Medicine Should Not Be Used: This medicine is not right for everyone. Do not use if you had an allergic reaction to fluticasone. How to Use This Medicine:  
Spray · Your doctor will tell you how much medicine to use. Do not use more than directed. · This medicine is for use only in the nose. Do not get any of it in your eyes or on your skin. If it does get on these areas, rinse it off right away. · Prime the spray: Release 6 test sprays into the air away from the face, or pump the bottle until some of the medicine sprays out. Now it is ready to use. Prime the spray if it has not been used for more than 7 days (or 30 days for Veramyst®) or if the cap has been left off the bottle for 5 days or longer. · Shake the medicine well just before each use. · Before using the medicine, gently blow your nose to clear the nostrils. · After using the nasal spray, wipe the tip of the bottle with a clean tissue and put the cap back on. · You may need to use this medicine for a few days before you start to feel better. · Read and follow the patient instructions that come with this medicine. Talk to your doctor or pharmacist if you have any questions. · Follow the instructions on the medicine label if you are using this medicine without a prescription. · Missed dose: Take a dose as soon as you remember. If it is almost time for your next dose, wait until then and take a regular dose. Do not take extra medicine to make up for a missed dose. · Keep the bottle tightly closed when not using it. Store at room temperature, away from heat and direct light.  Do not freeze or refrigerate. Throw this medicine away after you use 120 sprays. Drugs and Foods to Avoid: Ask your doctor or pharmacist before using any other medicine, including over-the-counter medicines, vitamins, and herbal products. · Do not use this medicine together with ritonavir. · Some foods and medicines can affect how fluticasone works. Tell your doctor if you are using ketoconazole. Warnings While Using This Medicine: · Tell your doctor if you are pregnant or breastfeeding, or if you have liver disease, asthma, an infection, or a history of cataracts or glaucoma. Make sure your doctor knows if you have had nose surgery, a nose injury, or a recent infection in your nose. · This medicine may cause the following problems: 
¨ Holes or ulcers inside the nose ¨ Slow wound healing ¨ Cataracts or glaucoma ¨ Problems with the adrenal glands ¨ Slow growth in children · Avoid people who are sick or have infections. Tell your doctor right away if you think you have been exposed to measles or chickenpox. · Your doctor will check your progress and the effects of this medicine at regular visits. Keep all appointments. · Call your doctor if your symptoms do not improve or if they get worse. · Keep all medicine out of the reach of children. Never share your medicine with anyone. Possible Side Effects While Using This Medicine:  
Call your doctor right away if you notice any of these side effects: · Allergic reaction: Itching or hives, swelling in your face or hands, swelling or tingling in your mouth or throat, chest tightness, trouble breathing · Burning, redness, swelling, or irritation around or inside your nose · Eye pain or vision changes · Fever, chills, cough, sore throat, and body aches · Heavy nosebleeds · Sores or white patches inside the nose or mouth · Tiredness, weakness, dizziness If you notice other side effects that you think are caused by this medicine, tell your doctor. Call your doctor for medical advice about side effects. You may report side effects to FDA at 4-078-FDA-1872 © 2017 [address] Information is for End User's use only and may not be sold, redistributed or otherwise used for commercial purposes. The above information is an  only. It is not intended as medical advice for individual conditions or treatments. Talk to your doctor, nurse or pharmacist before following any medical regimen to see if it is safe and effective for you. Introducing [facility] SERVICES! Dear Noel Roy: Thank you for requesting a [employer] account. Our records indicate that you already have an active [employer] account. You can access your account anytime at [email or web address] Did you know that you can access your hospital and ER discharge instructions at any time in [employer]? You can also review all of your test results from your hospital stay or ER visit. Additional Information If you have questions, please visit the Frequently Asked Questions section of the [employer] website at [email or web address] Remember, [employer] is NOT to be used for urgent needs. For medical emergencies, dial 911. Now available from your iPhone and Android! Please provide this summary of care documentation to your next provider. Your primary care clinician is listed as Pineda Hernandez. If you have any questions after today's visit, please call [phone].

## 2018-09-28 ENCOUNTER — CLINICAL SUPPORT (OUTPATIENT)
Dept: FAMILY MEDICINE CLINIC | Age: 38
End: 2018-09-28

## 2018-09-28 VITALS
WEIGHT: 315 LBS | HEART RATE: 98 BPM | HEIGHT: 76 IN | SYSTOLIC BLOOD PRESSURE: 140 MMHG | BODY MASS INDEX: 38.36 KG/M2 | DIASTOLIC BLOOD PRESSURE: 94 MMHG

## 2018-09-28 DIAGNOSIS — E66.9 OBESITY, UNSPECIFIED CLASSIFICATION, UNSPECIFIED OBESITY TYPE, UNSPECIFIED WHETHER SERIOUS COMORBIDITY PRESENT: Primary | ICD-10-CM

## 2018-09-28 NOTE — PROGRESS NOTES
Progress Note: Weekly Medical Monitoring in the [facility] Weight Loss Program    Is there anything that you or the patient needs to let the supervising provider know about? no    Over the past week, have you experienced any side-effects? no    Robyn Trotter is a 34 y.o. male who is enrolled in [facility]    Robyn Trotter was prescribed the VLCD / LCD. Visit Vitals    BP (!) 140/94 (BP 1 Location: Right arm, BP Patient Position: Sitting)    Pulse 98    Ht 6' 4\" (1.93 m)    Wt 158.1 kg (348 lb 9.6 oz)    BMI 42.43 kg/m2     Weight Metrics 9/28/2018 8/31/2018 8/30/2018 8/30/2018 8/23/2018 8/22/2018 8/15/2018   Weight 348 lb 9.6 oz 362 lb 3.2 oz - 359 lb 12.8 oz - 369 lb 3.2 oz -   Waist Measure Inches 57.5 - 57 - 58 - 58   Exercise Mins/week - - 120 - - - -   Body Fat % - - 39.2 - - - -   BMI 42.43 kg/m2 44.09 kg/m2 - 43.8 kg/m2 - 44.94 kg/m2 -         Have you received any other medical care this week? no  If yes, where and for what? Have you had any change in your medications since your last visit? no  If yes what? Did you have any problems adhering to the program last week? yes  If yes, please explain: vacation    Eating Habits Over Last Week:  Did you take in 64 oz of non-caloric fluids?  yes     Did you consume your prescribed meal replacement regimen each day? no       Physical Activity Over the Past Week:    Aerobic exercise: 210 min  Resistance exercise: 0 workouts / week

## 2018-10-10 ENCOUNTER — CLINICAL SUPPORT (OUTPATIENT)
Dept: FAMILY MEDICINE CLINIC | Age: 38
End: 2018-10-10

## 2018-10-10 DIAGNOSIS — E66.9 OBESITY, UNSPECIFIED CLASSIFICATION, UNSPECIFIED OBESITY TYPE, UNSPECIFIED WHETHER SERIOUS COMORBIDITY PRESENT: Primary | ICD-10-CM

## 2018-10-11 VITALS
HEART RATE: 88 BPM | SYSTOLIC BLOOD PRESSURE: 148 MMHG | WEIGHT: 315 LBS | DIASTOLIC BLOOD PRESSURE: 90 MMHG | HEIGHT: 76 IN | BODY MASS INDEX: 38.36 KG/M2

## 2018-10-11 NOTE — PROGRESS NOTES
Progress Note: Weekly Medical Monitoring in the [facility] Loss Program    Is there anything that you or the patient needs to let the supervising provider know about? no    Over the past week, have you experienced any side-effects? no    Rosemarie Everett is a 34 y.o. male who is enrolled in [facility]    Rosemarie Everett was prescribed the VLCD / LCD. Visit Vitals    /90 (BP 1 Location: Left arm, BP Patient Position: Sitting)    Pulse 88    Ht 6' 4\" (1.93 m)    Wt (!) 159.6 kg (351 lb 12.8 oz)    BMI 42.82 kg/m2     Weight Metrics 10/10/2018 9/28/2018 8/31/2018 8/30/2018 8/30/2018 8/23/2018 8/22/2018   Weight 351 lb 12.8 oz 348 lb 9.6 oz 362 lb 3.2 oz - 359 lb 12.8 oz - 369 lb 3.2 oz   Waist Measure Inches - 57.5 - 57 - 58 -   Exercise Mins/week - - - 120 - - -   Body Fat % - - - 39.2 - - -   BMI 42.82 kg/m2 42.43 kg/m2 44.09 kg/m2 - 43.8 kg/m2 - 44.94 kg/m2         Have you received any other medical care this week? no  If yes, where and for what? Have you had any change in your medications since your last visit? no  If yes what? Did you have any problems adhering to the program last week? no  If yes, please explain:       Eating Habits Over Last Week:  Did you take in 64 oz of non-caloric fluids?  no     Did you consume your prescribed meal replacement regimen each day? no       Physical Activity Over the Past Week:    Aerobic exercise: 110 min0  Resistance exercise: 0 workouts / week

## 2018-10-25 ENCOUNTER — OFFICE VISIT (OUTPATIENT)
Dept: SURGERY | Age: 38
End: 2018-10-25

## 2018-10-25 VITALS
DIASTOLIC BLOOD PRESSURE: 98 MMHG | TEMPERATURE: 98.5 F | SYSTOLIC BLOOD PRESSURE: 154 MMHG | WEIGHT: 315 LBS | HEIGHT: 76 IN | OXYGEN SATURATION: 98 % | BODY MASS INDEX: 38.36 KG/M2 | RESPIRATION RATE: 16 BRPM | HEART RATE: 80 BPM

## 2018-10-25 DIAGNOSIS — E66.01 MORBID (SEVERE) OBESITY DUE TO EXCESS CALORIES (HCC): Primary | ICD-10-CM

## 2018-10-25 DIAGNOSIS — Z91.119 DIETARY NONCOMPLIANCE: ICD-10-CM

## 2018-10-25 DIAGNOSIS — E79.0 ELEVATED URIC ACID IN BLOOD: ICD-10-CM

## 2018-10-25 DIAGNOSIS — Z01.818 PRE-OP TESTING: ICD-10-CM

## 2018-10-25 NOTE — PROGRESS NOTES
Sherryle Gash has been given the following recommendations today due to his elevated BP reading of 154/98: lifestyle modifications to include: increase physical activity. Pt just returned from a 15 hour flight. Pt instructed to increase movement and use his compression socks. F/U with PCP for B/P.

## 2018-10-25 NOTE — PROGRESS NOTES
Xu Mattie has been given the following recommendations today due to his elevated BP reading: please follow up with pcp regarding elevated bp.

## 2018-10-25 NOTE — PROGRESS NOTES
Progress Note: Weekly Medical Monitoring in the [facility] Loss Program   
Is there anything that you or the patient needs to let the supervising provider know about? no 
 
Over the past week, have you experienced any side-effects? no 
 
Walter Vásquez is a 34 y.o. male who is enrolled in [facility] 
 
Walter Vásquez was prescribed the VLCD / LCD. Visit Vitals BP (!) 140/98 Pulse 80 Temp 98.5 °F (36.9 °C) (Oral) Resp 16 Ht 6' 4\" (1.93 m) Wt (!) 164.7 kg (363 lb) SpO2 98% BMI 44.19 kg/m² Weight Metrics 10/25/2018 10/25/2018 10/10/2018 9/28/2018 8/31/2018 8/30/2018 8/30/2018 Weight - 363 lb 351 lb 12.8 oz 348 lb 9.6 oz 362 lb 3.2 oz - 359 lb 12.8 oz Waist Measure Inches 58.5 - - 57.5 - 57 - Exercise Mins/week - - - - - 120 - Body Fat % - - - - - 39.2 - BMI - 44.19 kg/m2 42.82 kg/m2 42.43 kg/m2 44.09 kg/m2 - 43.8 kg/m2 Have you received any other medical care this week? no  If yes, where and for what? Have you had any change in your medications since your last visit? no  If yes what? Did you have any problems adhering to the program last week? yes  If yes, please explain: [place] Eating Habits Over Last Week: 
Did you take in 64 oz of non-caloric fluids? yes Did you consume your prescribed meal replacement regimen each day? no  
 
 
Physical Activity Over the Past Week: 
 
Aerobic exercise: 0 min Resistance exercise: 0 workouts / week

## 2018-10-25 NOTE — PROGRESS NOTES
[facility] Progress Note:  
F/up Provider Visit CC: Weight Management Jose Henderson is a 34 y.o. male who is here for his  f/up medical provider visit for the VLCD Program. he did not attend class last week.  
 
+4lbs recent travel, difficult to stay focused on MR while out of the country Weight History Weight Metrics 10/25/2018 10/25/2018 10/10/2018 9/28/2018 8/31/2018 8/30/2018 8/30/2018 Weight - 363 lb 351 lb 12.8 oz 348 lb 9.6 oz 362 lb 3.2 oz - 359 lb 12.8 oz Waist Measure Inches 58.5 - - 57.5 - 57 - Exercise Mins/week - - - - - 120 - Body Fat % - - - - - 39.2 - BMI - 44.19 kg/m2 42.82 kg/m2 42.43 kg/m2 44.09 kg/m2 - 43.8 kg/m2  
 
 
starting weight 379.2lb Body mass index is 46.16 kg/(m^2). Current weight 363 lbs Goal weight 280 lb Highest weight 389 lb, at 36 years old Ideal body weight: 86.8 kg (191 lb 5.7 oz) Adjusted ideal body weight: 117.9 kg (260 lb 0.3 oz) Body mass index is 44.19 kg/m². History Past Medical History:  
Diagnosis Date  GERD (gastroesophageal reflux disease) uses OTC meds  Hypertension   
 no meds as of Jan 2018  Morbid obesity ([address]  Morbid obesity with body mass index of 40.0-49.9 (HCC) Past Surgical History:  
Procedure Laterality Date  HX KNEE ARTHROSCOPY    
 HX ORTHOPAEDIC    
 ankle surgery / no hardware  HX TONSILLECTOMY Current Outpatient Medications Medication Sig Dispense Refill  verapamil ER (VERELAN) 240 mg ER capsule Take 1 Cap by mouth daily. 30 Cap 6  ergocalciferol (ERGOCALCIFEROL) 50,000 unit capsule Take 1 Cap by mouth every seven (7) days. 4 Cap 3 Allergies Allergen Reactions  Lozol [Indapamide] Other (comments) Severe cramping in stomach area Social History Tobacco Use  Smoking status: Never Smoker  Smokeless tobacco: Never Used Substance Use Topics  Alcohol use: No  
  Alcohol/week: 3.6 oz  
 Types: 6 Shots of liquor per week  Drug use: No  
 
 
Family History Problem Relation Age of Onset  Cancer Mother  Hypertension Mother  Diabetes Mother  Diabetes Father  Hypertension Father Family Status Relation Name Status  Mother   at age 62s \" in a fire\"  Father  Alive HTN, DM Medications: Outpatient Medications Marked as Taking for the 10/25/18 encounter (Office Visit) with Susan Madsen NP Medication Sig Dispense Refill  verapamil ER (VERELAN) 240 mg ER capsule Take 1 Cap by mouth daily. 30 Cap 6 Review of Systems Review of Systems Cardiovascular: Positive for leg swelling. All other systems reviewed and are negative. Objective Visit Vitals BP (!) 140/98 Pulse 80 Temp 98.5 °F (36.9 °C) (Oral) Resp 16 Ht 6' 4\" (1.93 m) Wt (!) 164.7 kg (363 lb) SpO2 98% BMI 44.19 kg/m² No LMP for male patient. Physical Exam 
Physical Exam  
Constitutional: He is oriented to person, place, and time. He appears well-developed and well-nourished. HENT:  
Head: Normocephalic. Cardiovascular: Normal rate and regular rhythm. Pulmonary/Chest: Effort normal and breath sounds normal.  
Abdominal: Soft. Musculoskeletal: Normal range of motion. He exhibits edema. Neurological: He is alert and oriented to person, place, and time. Skin: Skin is warm and dry. Capillary refill takes less than 2 seconds. Psychiatric: He has a normal mood and affect. Assessment / Plan 1. Weight management Degree of control: recently poorly controlled with travel, needs to refocus and get back on track Progress was reviewed with patient. Goal(s) for next appointment: 
 -Mathieu Farah 2. Labs Latest results reviewed with patient Routine monitoring labs ordered Orders Placed This Encounter  METABOLIC PANEL, COMPREHENSIVE Standing Status:   Future Standing Expiration Date:   10/26/2019  URIC ACID Standing Status:   Future Standing Expiration Date:   10/26/2019  EKG, 12 LEAD, INITIAL Standing Status:   Future Standing Expiration Date:   12/25/2018 Order Specific Question:   Reason for Exam: Answer:   eval prior to tx Follow up 1 mo  
 
 >50% of 30 min visit spent counseling Iliana Cole, MELVINP-BC

## 2018-11-09 ENCOUNTER — CLINICAL SUPPORT (OUTPATIENT)
Dept: FAMILY MEDICINE CLINIC | Age: 38
End: 2018-11-09

## 2018-11-09 VITALS — WEIGHT: 315 LBS | HEIGHT: 76 IN | HEART RATE: 78 BPM | BODY MASS INDEX: 38.36 KG/M2

## 2018-11-09 DIAGNOSIS — E66.9 OBESITY, UNSPECIFIED CLASSIFICATION, UNSPECIFIED OBESITY TYPE, UNSPECIFIED WHETHER SERIOUS COMORBIDITY PRESENT: Primary | ICD-10-CM

## 2018-11-09 NOTE — PROGRESS NOTES
Progress Note: Weekly Medical Monitoring in the [facility] Loss Program   
Is there anything that you or the patient needs to let the supervising provider know about? no 
 
Over the past week, have you experienced any side-effects? no 
 
Presley Hernandez is a 34 y.o. male who is enrolled in [facility] 
 
Presley Hernandez was prescribed the VLCD / LCD. Visit Vitals Pulse 78 Ht 6' 4\" (1.93 m) Wt (!) 161.9 kg (357 lb) BMI 43.46 kg/m² Weight Metrics 11/9/2018 10/25/2018 10/25/2018 10/10/2018 9/28/2018 8/31/2018 8/30/2018 Weight 357 lb - 363 lb 351 lb 12.8 oz 348 lb 9.6 oz 362 lb 3.2 oz - Waist Measure Inches 55.5 58.5 - - 57.5 - 57 Exercise Mins/week - - - - - - 120 Body Fat % - - - - - - 39.2 BMI 43.46 kg/m2 - 44.19 kg/m2 42.82 kg/m2 42.43 kg/m2 44.09 kg/m2 - Have you received any other medical care this week? no  If yes, where and for what? Have you had any change in your medications since your last visit? no  If yes what? Did you have any problems adhering to the program last week? no  If yes, please explain:  
 
 
Eating Habits Over Last Week: 
Did you take in 64 oz of non-caloric fluids? yes Did you consume your prescribed meal replacement regimen each day? yes Physical Activity Over the Past Week: 
 
Aerobic exercise: 0 min Resistance exercise: 0 workouts / week

## 2018-11-14 ENCOUNTER — CLINICAL SUPPORT (OUTPATIENT)
Dept: FAMILY MEDICINE CLINIC | Age: 38
End: 2018-11-14

## 2018-11-14 DIAGNOSIS — E66.9 OBESITY, UNSPECIFIED CLASSIFICATION, UNSPECIFIED OBESITY TYPE, UNSPECIFIED WHETHER SERIOUS COMORBIDITY PRESENT: Primary | ICD-10-CM

## 2018-11-15 VITALS
HEART RATE: 80 BPM | WEIGHT: 315 LBS | SYSTOLIC BLOOD PRESSURE: 168 MMHG | DIASTOLIC BLOOD PRESSURE: 98 MMHG | HEIGHT: 76 IN | BODY MASS INDEX: 38.36 KG/M2

## 2018-11-15 DIAGNOSIS — I10 ESSENTIAL HYPERTENSION: ICD-10-CM

## 2018-11-15 RX ORDER — VERAPAMIL HYDROCHLORIDE 240 MG/1
240 CAPSULE, EXTENDED RELEASE ORAL DAILY
Qty: 90 CAP | Refills: 3 | Status: SHIPPED | OUTPATIENT
Start: 2018-11-15 | End: 2020-02-08

## 2018-11-15 NOTE — TELEPHONE ENCOUNTER
Last Visit: 08/31/2018 with MD Malina Garland  Next Appointment: 02/04/2019 with MD Malina Garland    Requested Prescriptions     Pending Prescriptions Disp Refills    verapamil ER (VERELAN) 240 mg ER capsule 90 Cap 3     Sig: Take 1 Cap by mouth daily. poor balance/as of 6/17/18

## 2018-11-15 NOTE — PROGRESS NOTES
Chief Complaint   Patient presents with    Weight Management     Progress Note: Weekly Medical Monitoring in the [facility] Loss Program    Is there anything that you or the patient needs to let the supervising provider know about? no    Over the past week, have you experienced any side-effects? no    Tamiko Whalen is a 34 y.o. male who is enrolled in [facility]    Tamiko Whalen was prescribed the VLCD / LCD. Visit Vitals  BP (!) 168/98   Pulse 80   Ht 6' 4\" (1.93 m)   Wt (!) 354 lb 9.6 oz (160.8 kg)   BMI 43.16 kg/m²     Weight Metrics 11/15/2018 11/14/2018 11/9/2018 10/25/2018 10/25/2018 10/10/2018 9/28/2018   Weight - 354 lb 9.6 oz 357 lb - 363 lb 351 lb 12.8 oz 348 lb 9.6 oz   Waist Measure Inches 57.5 - 55.5 58.5 - - 57.5   Exercise Mins/week - - - - - - -   Body Fat % - - - - - - -   BMI - 43.16 kg/m2 43.46 kg/m2 - 44.19 kg/m2 42.82 kg/m2 42.43 kg/m2         Have you received any other medical care this week? no  If yes, where and for what? Have you had any change in your medications since your last visit? no  If yes what? Did you have any problems adhering to the program last week? no  If yes, please explain:       Eating Habits Over Last Week:  Did you take in 64 oz of non-caloric fluids? yes     Did you consume your prescribed meal replacement regimen each day?  yes       Physical Activity Over the Past Week:    Aerobic exercise: 150 min  Resistance exercise: no workouts / week

## 2019-02-01 ENCOUNTER — TELEPHONE (OUTPATIENT)
Dept: INTERNAL MEDICINE CLINIC | Age: 39
End: 2019-02-01

## 2019-02-01 DIAGNOSIS — I10 ESSENTIAL HYPERTENSION: ICD-10-CM

## 2019-02-01 DIAGNOSIS — K75.81 NASH (NONALCOHOLIC STEATOHEPATITIS): Primary | ICD-10-CM

## 2019-02-01 DIAGNOSIS — E55.9 VITAMIN D DEFICIENCY: ICD-10-CM

## 2019-02-01 DIAGNOSIS — E79.0 HYPERURICEMIA: ICD-10-CM

## 2019-02-01 NOTE — TELEPHONE ENCOUNTER
Please schedule for a 30 min apt in May 2019 and cancel his apt this month with me. Please have him get fasting labs 1wk before his f/u apt with me.     Dr. Casey Rodriguez  [facility], [address] [place] [address], [facility]  Phone: [phone]  Fax: [phone]

## 2019-05-13 ENCOUNTER — TELEPHONE (OUTPATIENT)
Dept: INTERNAL MEDICINE CLINIC | Age: 39
End: 2019-05-13

## 2019-06-26 ENCOUNTER — HOSPITAL ENCOUNTER (OUTPATIENT)
Dept: LAB | Age: 39
Discharge: HOME | End: 2019-06-26
Payer: COMMERCIAL

## 2019-06-26 ENCOUNTER — APPOINTMENT (OUTPATIENT)
Dept: INTERNAL MEDICINE CLINIC | Age: 39
End: 2019-06-26

## 2019-06-26 DIAGNOSIS — E79.0 HYPERURICEMIA: ICD-10-CM

## 2019-06-26 DIAGNOSIS — K75.81 NASH (NONALCOHOLIC STEATOHEPATITIS): ICD-10-CM

## 2019-06-26 DIAGNOSIS — E55.9 VITAMIN D DEFICIENCY: ICD-10-CM

## 2019-06-26 DIAGNOSIS — I10 ESSENTIAL HYPERTENSION: ICD-10-CM

## 2019-06-26 LAB
25(OH)D3 SERPL-MCNC: 13.5 NG/ML (ref 30–100)
ALBUMIN SERPL-MCNC: 3.8 G/DL (ref 3.4–5)
ALBUMIN/GLOB SERPL: 1.2 {RATIO} (ref 0.8–1.7)
ALP SERPL-CCNC: 80 U/L (ref 45–117)
ALT SERPL-CCNC: 47 U/L (ref 16–61)
ANION GAP SERPL CALC-SCNC: 9 MMOL/L (ref 3–18)
AST SERPL-CCNC: 20 U/L (ref 15–37)
BASOPHILS # BLD: 0 K/UL (ref 0–0.1)
BASOPHILS NFR BLD: 0 % (ref 0–2)
BILIRUB SERPL-MCNC: 0.4 MG/DL (ref 0.2–1)
BUN SERPL-MCNC: 15 MG/DL (ref 7–18)
BUN/CREAT SERPL: 13 (ref 12–20)
CALCIUM SERPL-MCNC: 8.3 MG/DL (ref 8.5–10.1)
CHLORIDE SERPL-SCNC: 106 MMOL/L (ref 100–108)
CHOLEST SERPL-MCNC: 182 MG/DL
CO2 SERPL-SCNC: 27 MMOL/L (ref 21–32)
CREAT SERPL-MCNC: 1.14 MG/DL (ref 0.6–1.3)
CREAT UR-MCNC: 192 MG/DL (ref 30–125)
DIFFERENTIAL METHOD BLD: NORMAL
EOSINOPHIL # BLD: 0.1 K/UL (ref 0–0.4)
EOSINOPHIL NFR BLD: 1 % (ref 0–5)
ERYTHROCYTE [DISTWIDTH] IN BLOOD BY AUTOMATED COUNT: 14.4 % (ref 11.6–14.5)
GLOBULIN SER CALC-MCNC: 3.2 G/DL (ref 2–4)
GLUCOSE SERPL-MCNC: 96 MG/DL (ref 74–99)
HBA1C MFR BLD: 5.9 % (ref 4.2–5.6)
HCT VFR BLD AUTO: 43.9 % (ref 36–48)
HDLC SERPL-MCNC: 71 MG/DL (ref 40–60)
HDLC SERPL: 2.6 {RATIO} (ref 0–5)
HGB BLD-MCNC: 14.4 G/DL (ref 13–16)
LDLC SERPL CALC-MCNC: 92.8 MG/DL (ref 0–100)
LIPID PROFILE,FLP: ABNORMAL
LYMPHOCYTES # BLD: 2.4 K/UL (ref 0.9–3.6)
LYMPHOCYTES NFR BLD: 33 % (ref 21–52)
MCH RBC QN AUTO: 26.7 PG (ref 24–34)
MCHC RBC AUTO-ENTMCNC: 32.8 G/DL (ref 31–37)
MCV RBC AUTO: 81.4 FL (ref 74–97)
MICROALBUMIN UR-MCNC: 0.85 MG/DL (ref 0–3)
MICROALBUMIN/CREAT UR-RTO: 4 MG/G (ref 0–30)
MONOCYTES # BLD: 0.4 K/UL (ref 0.05–1.2)
MONOCYTES NFR BLD: 6 % (ref 3–10)
NEUTS SEG # BLD: 4.2 K/UL (ref 1.8–8)
NEUTS SEG NFR BLD: 60 % (ref 40–73)
PLATELET # BLD AUTO: 231 K/UL (ref 135–420)
PMV BLD AUTO: 10.8 FL (ref 9.2–11.8)
POTASSIUM SERPL-SCNC: 4.1 MMOL/L (ref 3.5–5.5)
PROT SERPL-MCNC: 7 G/DL (ref 6.4–8.2)
RBC # BLD AUTO: 5.39 M/UL (ref 4.7–5.5)
SODIUM SERPL-SCNC: 142 MMOL/L (ref 136–145)
TRIGL SERPL-MCNC: 91 MG/DL (ref ?–150)
URATE SERPL-MCNC: 6.8 MG/DL (ref 2.6–7.2)
VLDLC SERPL CALC-MCNC: 18.2 MG/DL
WBC # BLD AUTO: 7.1 K/UL (ref 4.6–13.2)

## 2019-06-26 PROCEDURE — 82043 UR ALBUMIN QUANTITATIVE: CPT

## 2019-06-26 PROCEDURE — 84550 ASSAY OF BLOOD/URIC ACID: CPT

## 2019-06-26 PROCEDURE — 36415 COLL VENOUS BLD VENIPUNCTURE: CPT

## 2019-06-26 PROCEDURE — 82306 VITAMIN D 25 HYDROXY: CPT

## 2019-06-26 PROCEDURE — 83036 HEMOGLOBIN GLYCOSYLATED A1C: CPT

## 2019-06-26 PROCEDURE — 80061 LIPID PANEL: CPT

## 2019-06-26 PROCEDURE — 80053 COMPREHEN METABOLIC PANEL: CPT

## 2019-06-26 PROCEDURE — 85025 COMPLETE CBC W/AUTO DIFF WBC: CPT

## 2019-07-01 NOTE — PROGRESS NOTES
I will let him know that his appointment this week that his vitamin D level is 13.5. He will need prescription strength vitamin D. His A1c is 5.9 and consistent with prediabetes. His kidney and liver function labs are normal.  His calcium is mildly low at 8.3. He should take a multivitamin daily. His uric acid level is 6.8. It was 7.8 ten months ago his cholesterol panel shows a total cholesterol of 182. His triglycerides are 91. His HDL is 71. His LDL is 92.8.   His CBC is normal.    Dr. Harjinder Deleon  [facility] [address], [address] [place] [address] Str[address]  Phone: [phone]  Fax: [phone]

## 2019-07-03 ENCOUNTER — OFFICE VISIT (OUTPATIENT)
Dept: INTERNAL MEDICINE CLINIC | Age: 39
End: 2019-07-03

## 2019-07-03 VITALS
DIASTOLIC BLOOD PRESSURE: 98 MMHG | TEMPERATURE: 99.1 F | HEART RATE: 88 BPM | RESPIRATION RATE: 16 BRPM | BODY MASS INDEX: 38.36 KG/M2 | OXYGEN SATURATION: 94 % | HEIGHT: 76 IN | SYSTOLIC BLOOD PRESSURE: 160 MMHG | WEIGHT: 315 LBS

## 2019-07-03 DIAGNOSIS — K75.81 NASH (NONALCOHOLIC STEATOHEPATITIS): ICD-10-CM

## 2019-07-03 DIAGNOSIS — E66.01 MORBID OBESITY (HCC): ICD-10-CM

## 2019-07-03 DIAGNOSIS — E55.9 VITAMIN D DEFICIENCY: ICD-10-CM

## 2019-07-03 DIAGNOSIS — I10 ESSENTIAL HYPERTENSION: Primary | ICD-10-CM

## 2019-07-03 DIAGNOSIS — R73.02 IMPAIRED GLUCOSE TOLERANCE: ICD-10-CM

## 2019-07-03 RX ORDER — AMOXICILLIN AND CLAVULANATE POTASSIUM 500; 125 MG/1; MG/1
TABLET, FILM COATED ORAL
Refills: 0 | COMMUNITY
Start: 2019-05-20 | End: 2019-07-03 | Stop reason: SINTOL

## 2019-07-03 RX ORDER — ERGOCALCIFEROL 1.25 MG/1
50000 CAPSULE ORAL
Qty: 4 CAP | Refills: 6 | Status: SHIPPED | OUTPATIENT
Start: 2019-07-03 | End: 2019-12-20 | Stop reason: SDUPTHER

## 2019-07-03 RX ORDER — LOSARTAN POTASSIUM 50 MG/1
50 TABLET ORAL DAILY
Qty: 30 TAB | Refills: 6 | Status: SHIPPED | OUTPATIENT
Start: 2019-07-03 | End: 2020-01-05

## 2019-07-03 NOTE — PROGRESS NOTES
[facility]:  7/3/2019, MRN: [id number]      Cooper Regan is a 37 y.o. male and presents to clinic for Hypertension (follow up  ROOM 2) and Labs (done 6-26-19)    Subjective: The patient is a 40-year-old male with history of CONDE, hyperuricemia, vitamin D deficiency, obesity, GERD, and hypertension with white coat hypertension. 1. CONDE: Present for over 6 months. His most recent labs show that his liver tests are normal.  No excessive alcohol beverage consumption. 2.  Vitamin D Deficiency: His most recent labs show that his vitamin D level is 13.5. 3.  Prediabetes: His most recent labs show that his A1c is 5.9. He stopped participating in the medically survived weight loss program.  His weight is 385lbs today. He is regularly exercising, 3-4 times per wk. He is not dieting though. 4. HLD: His most recent labs show: His cholesterol panel shows a total cholesterol of 182. His triglycerides are 91. His HDL is 71. His LDL is 92.8.    5.  Hypertension: On verapamil. No adverse side effects of taking his medication. Bp is 160/98 today. There is no evidence of chronic kidney disease per his most recent labs. Home SBPs have been 140-150s over the past year. +Snoring. He has never been screened for HERACLIO. Patient Active Problem List    Diagnosis Date Noted    CONDE (nonalcoholic steatohepatitis) 08/31/2018    Hyperuricemia 08/31/2018    Vitamin D deficiency 08/31/2018    Morbid obesity ([address]     GERD (gastroesophageal reflux disease)     Hypertension 12/01/2015       Current Outpatient Medications   Medication Sig Dispense Refill    losartan (COZAAR) 50 mg tablet Take 1 Tab by mouth daily. 30 Tab 6    ergocalciferol (ERGOCALCIFEROL) 50,000 unit capsule Take 1 Cap by mouth every seven (7) days. 4 Cap 6    verapamil ER (VERELAN) 240 mg ER capsule Take 1 Cap by mouth daily.  90 Cap 3       Allergies   Allergen Reactions    Lozol [Indapamide] Other (comments)     Severe cramping in stomach area    Amoxicillin Diarrhea       Past Medical History:   Diagnosis Date    GERD (gastroesophageal reflux disease)     uses OTC meds    Hypertension     no meds as of Jan 2018    Morbid [address]     Morbid obesity with body mass index of 40.0-49.9 ([facility]        Past Surgical History:   Procedure Laterality Date    HX KNEE ARTHROSCOPY      HX ORTHOPAEDIC      ankle surgery / no hardware    HX TONSILLECTOMY         Family History   Problem Relation Age of Onset    Cancer Mother     Hypertension Mother     Diabetes Mother     Diabetes Father     Hypertension Father        Social History     Tobacco Use    Smoking status: Never Smoker    Smokeless tobacco: Never Used   Substance Use Topics    Alcohol use: Yes     Alcohol/week: 3.6 oz     Types: 6 Shots of liquor per week       ROS   Review of Systems   Constitutional: Negative for chills, fever and weight loss. HENT: Negative for ear pain and sore throat. Eyes: Negative for blurred vision and pain. Respiratory: Negative for cough and shortness of breath. Cardiovascular: Negative for chest pain. Gastrointestinal: Negative for abdominal pain, blood in stool and melena. Genitourinary: Negative for dysuria and hematuria. Musculoskeletal: Positive for joint pain (ankle). Negative for myalgias. Skin: Negative for rash. Neurological: Negative for tingling, focal weakness and headaches. Endo/Heme/Allergies: Does not bruise/bleed easily. Psychiatric/Behavioral: Negative for substance abuse. Objective     Vitals:    07/03/19 0931 07/03/19 0942   BP: (!) 171/107 (!) 160/98   Pulse:  88   Resp: 18 16   Temp: 99.1 °F (37.3 °C)    TempSrc: Oral    SpO2: 94%    Weight: (!) 385 lb 6.4 oz (174.8 kg)    Height: 6' 4\" (1.93 m)    PainSc:   0 - No pain        Physical Exam   Constitutional: He is oriented to person, place, and time and well-developed, well-nourished, and in no distress. HENT:   Head: Normocephalic and atraumatic. Right Ear: External ear normal.   Left Ear: External ear normal.   Nose: Nose normal.   Mouth/Throat: Oropharynx is clear and moist. No oropharyngeal exudate. Eyes: Conjunctivae and EOM are normal. Right eye exhibits no discharge. Left eye exhibits no discharge. No scleral icterus. Neck: Neck supple. Cardiovascular: Normal rate, regular rhythm, normal heart sounds and intact distal pulses. Pulmonary/Chest: Effort normal and breath sounds normal.   Abdominal: Soft. Bowel sounds are normal. He exhibits no distension. There is no tenderness. There is no rebound and no guarding. Musculoskeletal: He exhibits no edema or tenderness (BUE). Lymphadenopathy:     He has no cervical adenopathy. Neurological: He is alert and oriented to person, place, and time. He exhibits normal muscle tone. Gait normal.   Skin: Skin is warm and dry. No erythema. Psychiatric: Affect normal.   Nursing note and vitals reviewed.       LABS   Data Review:   Lab Results   Component Value Date/Time    WBC 7.1 06/26/2019 08:48 AM    HGB 14.4 06/26/2019 08:48 AM    HCT 43.9 06/26/2019 08:48 AM    PLATELET 822 35/24/5391 08:48 AM    MCV 81.4 06/26/2019 08:48 AM       Lab Results   Component Value Date/Time    Sodium 142 06/26/2019 08:48 AM    Potassium 4.1 06/26/2019 08:48 AM    Chloride 106 06/26/2019 08:48 AM    CO2 27 06/26/2019 08:48 AM    Anion gap 9 06/26/2019 08:48 AM    Glucose 96 06/26/2019 08:48 AM    BUN 15 06/26/2019 08:48 AM    Creatinine 1.14 06/26/2019 08:48 AM    BUN/Creatinine ratio 13 06/26/2019 08:48 AM    GFR est AA >60 06/26/2019 08:48 AM    GFR est non-AA >60 06/26/2019 08:48 AM    Calcium 8.3 (L) 06/26/2019 08:48 AM       Lab Results   Component Value Date/Time    Cholesterol, total 182 06/26/2019 08:48 AM    HDL Cholesterol 71 (H) 06/26/2019 08:48 AM    LDL, calculated 92.8 06/26/2019 08:48 AM    VLDL, calculated 18.2 06/26/2019 08:48 AM    Triglyceride 91 06/26/2019 08:48 AM    CHOL/HDL Ratio 2.6 06/26/2019 08:48 AM       Lab Results   Component Value Date/Time    Hemoglobin A1c 5.9 (H) 06/26/2019 08:48 AM       Assessment/Plan:   1. Essential hypertension: BP is up but so is his weight. Continue with Rx as prescribed. Adding losartan 50 mg daily. Return to clinic for BP check. ORDERS:  - losartan (COZAAR) 50 mg tablet; Take 1 Tab by mouth daily. Dispense: 30 Tab; Refill: 6    2. Vitamin D Deficiency:   Ordering prescription strength vitamin D. I encouraged him to take over-the-counter vitamin D once he completes this prescription. ORDERS:  - ergocalciferol (ERGOCALCIFEROL) 50,000 unit capsule; Take 1 Cap by mouth every seven (7) days. Dispense: 4 Cap; Refill: 6    3. Prediabetes:  I encouraged him to reduce his weight. I encouraged him to do cardio in place of weightlifting. I also encouraged him to reduce his carb intake, eating protein like meat and green vegetables. 4. CONDE/HLD:   - We discussed the importance of aggressive lifestyle modification measures.  I will recheck his weight at his follow-up appointment. He is to lose 5 pounds in the next month. Health Maintenance Due   Topic Date Due    DTaP/Tdap/Td series (1 - Tdap) 02/07/1999     Lab review: labs are reviewed in the EHR    I have discussed the diagnosis with the patient and the intended plan as seen in the above orders. The patient has received an after-visit summary and questions were answered concerning future plans. I have discussed medication side effects and warnings with the patient as well. I have reviewed the plan of care with the patient, accepted their input and they are in agreement with the treatment goals. All questions were answered. The patient understands the plan of care. Handouts provided today with above information. Pt instructed if symptoms worsen to call the office or report to the ED for continued care. Greater than 50% of the visit time was spent in counseling and/or coordination of care. Voice recognition was used to generate this report, which may have resulted in some phonetic based errors in grammar and contents. Even though attempts were made to correct all the mistakes, some may have been missed, and remained in the body of the document. Follow-up and Dispositions    · Return in about 1 month (around 7/31/2019) for BP check, weight check.          Booker Cadena MD

## 2019-07-03 NOTE — PROGRESS NOTES
Chief Complaint   Patient presents with    Hypertension     follow up  ROOM 2    Labs     done 6-26-19     Patient states he takes the blood pressure medication around 7:30 am every day, the patient states he has not missed any blood pressure medication. The patient has taken his blood pressure at home and it ranges 148-156/85-98 when waking up in the morning, and after working out. 1. Have you been to the ER, urgent care clinic since your last visit? Hospitalized since your last visit? No    2. Have you seen or consulted any other health care providers outside of the [address] [place] [address] since your last visit? Include any pap smears or colon screening. No    Patient was given a copy of the Advanced Directive and understands to bring it in once completed.   Health Maintenance Due   Topic Date Due    DTaP/Tdap/Td series (1 - Tdap) 02/07/1999

## 2019-07-03 NOTE — PATIENT INSTRUCTIONS
Health Maintenance Due Topic Date Due  
 DTaP/Tdap/Td series (1 - Tdap) 02/07/1999 High Blood Pressure: Care Instructions Overview It's normal for blood pressure to go up and down throughout the day. But if it stays up, you have high blood pressure. Another name for high blood pressure is hypertension. Despite what a lot of people think, high blood pressure usually doesn't cause headaches or make you feel dizzy or lightheaded. It usually has no symptoms. But it does increase your risk of stroke, heart attack, and other problems. You and your doctor will talk about your risks of these problems based on your blood pressure. Your doctor will give you a goal for your blood pressure. Your goal will be based on your health and your age. Lifestyle changes, such as eating healthy and being active, are always important to help lower blood pressure. You might also take medicine to reach your blood pressure goal. 
Follow-up care is a key part of your treatment and safety. Be sure to make and go to all appointments, and call your doctor if you are having problems. It's also a good idea to know your test results and keep a list of the medicines you take. How can you care for yourself at home? Medical treatment · If you stop taking your medicine, your blood pressure will go back up. You may take one or more types of medicine to lower your blood pressure. Be safe with medicines. Take your medicine exactly as prescribed. Call your doctor if you think you are having a problem with your medicine. · Talk to your doctor before you start taking aspirin every day. Aspirin can help certain people lower their risk of a heart attack or stroke. But taking aspirin isn't right for everyone, because it can cause serious bleeding. · See your doctor regularly. You may need to see the doctor more often at first or until your blood pressure comes down. · If you are taking blood pressure medicine, talk to your doctor before you take decongestants or anti-inflammatory medicine, such as ibuprofen. Some of these medicines can raise blood pressure. · Learn how to check your blood pressure at home. Lifestyle changes · Stay at a healthy weight. This is especially important if you put on weight around the waist. Losing even 10 pounds can help you lower your blood pressure. · If your doctor recommends it, get more exercise. Walking is a good choice. Bit by bit, increase the amount you walk every day. Try for at least 30 minutes on most days of the week. You also may want to swim, bike, or do other activities. · Avoid or limit alcohol. Talk to your doctor about whether you can drink any alcohol. · Try to limit how much sodium you eat to less than 2,300 milligrams (mg) a day. Your doctor may ask you to try to eat less than 1,500 mg a day. · Eat plenty of fruits (such as bananas and oranges), vegetables, legumes, whole grains, and low-fat dairy products. · Lower the amount of saturated fat in your diet. Saturated fat is found in animal products such as milk, cheese, and meat. Limiting these foods may help you lose weight and also lower your risk for heart disease. · Do not smoke. Smoking increases your risk for heart attack and stroke. If you need help quitting, talk to your doctor about stop-smoking programs and medicines. These can increase your chances of quitting for good. When should you call for help? Call 911 anytime you think you may need emergency care. This may mean having symptoms that suggest that your blood pressure is causing a serious heart or blood vessel problem. Your blood pressure may be over 180/120. 
 For example, call 911 if: 
  · You have symptoms of a heart attack. These may include: 
? Chest pain or pressure, or a strange feeling in the chest. 
? Sweating. ? Shortness of breath. ? Nausea or vomiting. ? Pain, pressure, or a strange feeling in the back, neck, jaw, or upper belly or in one or both shoulders or arms. ? Lightheadedness or sudden weakness. ? A fast or irregular heartbeat.  
  · You have symptoms of a stroke. These may include: 
? Sudden numbness, tingling, weakness, or loss of movement in your face, arm, or leg, especially on only one side of your body. ? Sudden vision changes. ? Sudden trouble speaking. ? Sudden confusion or trouble understanding simple statements. ? Sudden problems with walking or balance. ? A sudden, severe headache that is different from past headaches.  
  · You have severe back or belly pain.  
 Do not wait until your blood pressure comes down on its own. Get help right away. 
 Call your doctor now or seek immediate care if: 
  · Your blood pressure is much higher than normal (such as 180/120 or higher), but you don't have symptoms.  
  · You think high blood pressure is causing symptoms, such as: 
? Severe headache. 
? Blurry vision.  
 Watch closely for changes in your health, and be sure to contact your doctor if: 
  · Your blood pressure measures higher than your doctor recommends at least 2 times. That means the top number is higher or the bottom number is higher, or both.  
  · You think you may be having side effects from your blood pressure medicine. Where can you learn more? Go to http://dez-jasmyn.info/. Enter N628 in the search box to learn more about \"High Blood Pressure: Care Instructions. \" Current as of: July 22, 2018 Content Version: 11.9 © 0310-4246 [employer]. Care instructions adapted under license by [employer] (which disclaims liability or warranty for this information). If you have questions about a medical condition or this instruction, always ask your healthcare professional. [place] [address] any warranty or liability for your use of this information.

## 2019-08-08 ENCOUNTER — OFFICE VISIT (OUTPATIENT)
Dept: INTERNAL MEDICINE CLINIC | Age: 39
End: 2019-08-08

## 2019-08-08 VITALS
WEIGHT: 315 LBS | HEIGHT: 76 IN | DIASTOLIC BLOOD PRESSURE: 80 MMHG | HEART RATE: 86 BPM | BODY MASS INDEX: 38.36 KG/M2 | RESPIRATION RATE: 14 BRPM | SYSTOLIC BLOOD PRESSURE: 150 MMHG | TEMPERATURE: 99.5 F | OXYGEN SATURATION: 98 %

## 2019-08-08 DIAGNOSIS — R73.02 IMPAIRED GLUCOSE TOLERANCE: ICD-10-CM

## 2019-08-08 DIAGNOSIS — I10 ESSENTIAL HYPERTENSION: Primary | ICD-10-CM

## 2019-08-08 DIAGNOSIS — E66.01 MORBID OBESITY (HCC): ICD-10-CM

## 2019-08-08 NOTE — PROGRESS NOTES
Chief Complaint   Patient presents with    Hypertension     follow up ROOM  2    Vitamin D Deficiency    Labs     done 6-26-19   ,,  Patient took his blood pressure medications at 10:30 am today when he woke up. The patient has been told in the past multiple times to take his blood pressure medications at least 2 hours prior to being seen for his follow up on Hypertension, and understands the importance of the medications being in his system in order for us to see if the medications are helping. 1. Have you been to the ER, urgent care clinic since your last visit? Hospitalized since your last visit? No    2. Have you seen or consulted any other health care providers outside of the [address] [place] [address] since your last visit? Include any pap smears or colon screening. No    Patient was given a copy of the Advanced Directive and understands to bring it in once completed.   Health Maintenance Due   Topic Date Due    DTaP/Tdap/Td series (1 - Tdap) 02/07/1999

## 2019-08-08 NOTE — PROGRESS NOTES
[facility] OF [facility]:  8/8/2019, MRN: [id number]      Roel Kay is a 37 y.o. male and presents to clinic for Hypertension (follow up ROOM  2); Vitamin D Deficiency; and Labs (done 6-26-19)    Subjective: The patient is a 68-year-old male with history of CONDE, hyperuricemia, vitamin D deficiency, obesity, GERD, and hypertension with white coat hypertension. 1. HTN: He is taking verapamil. Losartan 50mg daily was added at his last apt. He has edema off/on in his legs. His BP is 160/90. He took his BP meds an hour ago. He is not regularly exercising. He states that his BPs outside of our office are typically less than 140/90. He did not bring his blood pressure machine today. BP Readings from Last 3 Encounters:   08/08/19 150/80   07/03/19 (!) 160/98   11/15/18 (!) 168/98     2. Prediabetes: His most recent labs show that his A1c is 5.9. It is unchanged. His weight is 384 pounds roughly the same as it was at his last appointment. His diet has not changed since his last appointment. Patient Active Problem List    Diagnosis Date Noted    Impaired glucose tolerance 07/03/2019    CONDE (nonalcoholic steatohepatitis) 08/31/2018    Hyperuricemia 08/31/2018    Vitamin D deficiency 08/31/2018    Morbid obesity ([address]     GERD (gastroesophageal reflux disease)     Hypertension 12/01/2015       Current Outpatient Medications   Medication Sig Dispense Refill    losartan (COZAAR) 50 mg tablet Take 1 Tab by mouth daily. 30 Tab 6    ergocalciferol (ERGOCALCIFEROL) 50,000 unit capsule Take 1 Cap by mouth every seven (7) days. 4 Cap 6    verapamil ER (VERELAN) 240 mg ER capsule Take 1 Cap by mouth daily.  90 Cap 3       Allergies   Allergen Reactions    Lozol [Indapamide] Other (comments)     Severe cramping in stomach area    Amoxicillin Diarrhea       Past Medical History:   Diagnosis Date    GERD (gastroesophageal reflux disease)     uses OTC meds    Hypertension     no meds as of Jan 2018    [facility]bid [facility]     Morbid obesity with body mass index of 40.0-49.9 ([facility])        Past Surgical History:   Procedure Laterality Date    HX KNEE ARTHROSCOPY      HX ORTHOPAEDIC      ankle surgery / no hardware    HX TONSILLECTOMY         Family History   Problem Relation Age of Onset    Cancer Mother     Hypertension Mother     Diabetes Mother     Diabetes Father     Hypertension Father        Social History     Tobacco Use    Smoking status: Never Smoker    Smokeless tobacco: Never Used   Substance Use Topics    Alcohol use: Yes     Alcohol/week: 6.0 standard drinks     Types: 6 Shots of liquor per week       ROS   Review of Systems   Constitutional: Negative for chills and fever. HENT: Negative for ear pain and sore throat. Eyes: Negative for blurred vision and pain. Respiratory: Negative for cough and shortness of breath. Cardiovascular: Negative for chest pain. Gastrointestinal: Negative for abdominal pain, blood in stool and melena. Genitourinary: Negative for dysuria and hematuria. Musculoskeletal: Positive for joint pain (chronic unchanged ankle jt pain). Negative for myalgias. Skin: Negative for rash. Neurological: Negative for tingling, focal weakness and headaches. Endo/Heme/Allergies: Does not bruise/bleed easily. Psychiatric/Behavioral: Negative for substance abuse. Objective     Vitals:    08/08/19 1107 08/08/19 1118   BP: (!) 160/104 160/90   Pulse: 89 95   Resp: 20 16   Temp: 99.5 °F (37.5 °C)    TempSrc: Oral    SpO2: 98%    Weight: (!) 384 lb 12.8 oz (174.5 kg)    Height: 6' 4\" (1.93 m)    PainSc:   0 - No pain        Physical Exam   Constitutional: He is oriented to person, place, and time and well-developed, well-nourished, and in no distress. HENT:   Head: Normocephalic and atraumatic. Right Ear: External ear normal.   Left Ear: External ear normal.   Nose: Nose normal.   Mouth/Throat: Oropharynx is clear and moist. No oropharyngeal exudate. Eyes: Conjunctivae and EOM are normal. Right eye exhibits no discharge. Left eye exhibits no discharge. No scleral icterus. Neck: Neck supple. Cardiovascular: Normal rate, regular rhythm, normal heart sounds and intact distal pulses. Pulmonary/Chest: Effort normal and breath sounds normal. No respiratory distress. Abdominal: Soft. Bowel sounds are normal. He exhibits no distension. There is no tenderness. There is no rebound and no guarding. Musculoskeletal: He exhibits no edema or tenderness ([place]). Lymphadenopathy:     He has no cervical adenopathy. Neurological: He is alert and oriented to person, place, and time. He exhibits normal muscle tone. Gait normal.   Skin: Skin is warm and dry. No erythema. Psychiatric: Affect normal.   Nursing note and vitals reviewed. LABS   Data Review:   Lab Results   Component Value Date/Time    WBC 7.1 06/26/2019 08:48 AM    HGB 14.4 06/26/2019 08:48 AM    HCT 43.9 06/26/2019 08:48 AM    PLATELET 692 61/35/9972 08:48 AM    MCV 81.4 06/26/2019 08:48 AM       Lab Results   Component Value Date/Time    Sodium 142 06/26/2019 08:48 AM    Potassium 4.1 06/26/2019 08:48 AM    Chloride 106 06/26/2019 08:48 AM    CO2 27 06/26/2019 08:48 AM    Anion gap 9 06/26/2019 08:48 AM    Glucose 96 06/26/2019 08:48 AM    BUN 15 06/26/2019 08:48 AM    Creatinine 1.14 06/26/2019 08:48 AM    BUN/Creatinine ratio 13 06/26/2019 08:48 AM    GFR est AA >60 06/26/2019 08:48 AM    GFR est non-AA >60 06/26/2019 08:48 AM    Calcium 8.3 (L) 06/26/2019 08:48 AM       Lab Results   Component Value Date/Time    Cholesterol, total 182 06/26/2019 08:48 AM    HDL Cholesterol 71 (H) 06/26/2019 08:48 AM    LDL, calculated 92.8 06/26/2019 08:48 AM    VLDL, calculated 18.2 06/26/2019 08:48 AM    Triglyceride 91 06/26/2019 08:48 AM    CHOL/HDL Ratio 2.6 06/26/2019 08:48 AM       Lab Results   Component Value Date/Time    Hemoglobin A1c 5.9 (H) 06/26/2019 08:48 AM       Assessment/Plan:   1. Hypertension: BP is falsely elevated in the office?  -Continue with Rx as prescribed. Return to clinic for BP check.  -I encouraged him to bring his BP cuff to his follow-up appointment. We will check his machine readings with our machine. 2.  Prediabetes: Unchanged.  -I encouraged him to reduce his weight by limiting his processed food intake. I encouraged him to exercise as well as tolerated given his chronic ankle pain. I will recheck his weight at his follow-up appointment. Health Maintenance Due   Topic Date Due    DTaP/Tdap/Td series (1 - Tdap) 02/07/1999     Lab review: labs are reviewed in the EHR    I have discussed the diagnosis with the patient and the intended plan as seen in the above orders. The patient has received an after-visit summary and questions were answered concerning future plans. I have discussed medication side effects and warnings with the patient as well. I have reviewed the plan of care with the patient, accepted their input and they are in agreement with the treatment goals. All questions were answered. The patient understands the plan of care. Handouts provided today with above information. Pt instructed if symptoms worsen to call the office or report to the ED for continued care. Greater than 50% of the visit time was spent in counseling and/or coordination of care. Voice recognition was used to generate this report, which may have resulted in some phonetic based errors in grammar and contents. Even though attempts were made to correct all the mistakes, some may have been missed, and remained in the body of the document.           Margarita Burdick MD

## 2019-08-08 NOTE — PATIENT INSTRUCTIONS
Health Maintenance Due   Topic Date Due    DTaP/Tdap/Td series (1 - Tdap) 02/07/1999          Body Mass Index: Care Instructions  Your Care Instructions    Body mass index (BMI) can help you see if your weight is raising your risk for health problems. It uses a formula to compare how much you weigh with how tall you are. · A BMI lower than 18.5 is considered underweight. · A BMI between 18.5 and 24.9 is considered healthy. · A BMI between 25 and 29.9 is considered overweight. A BMI of 30 or higher is considered obese. If your BMI is in the normal range, it means that you have a lower risk for weight-related health problems. If your BMI is in the overweight or obese range, you may be at increased risk for weight-related health problems, such as high blood pressure, heart disease, stroke, arthritis or joint pain, and diabetes. If your BMI is in the underweight range, you may be at increased risk for health problems such as fatigue, lower protection (immunity) against illness, muscle loss, bone loss, hair loss, and hormone problems. BMI is just one measure of your risk for weight-related health problems. You may be at higher risk for health problems if you are not active, you eat an unhealthy diet, or you drink too much alcohol or use tobacco products. Follow-up care is a key part of your treatment and safety. Be sure to make and go to all appointments, and call your doctor if you are having problems. It's also a good idea to know your test results and keep a list of the medicines you take. How can you care for yourself at home? · Practice healthy eating habits. This includes eating plenty of fruits, vegetables, whole grains, lean protein, and low-fat dairy. · If your doctor recommends it, get more exercise. Walking is a good choice. Bit by bit, increase the amount you walk every day. Try for at least 30 minutes on most days of the week. · Do not smoke. Smoking can increase your risk for health problems. If you need help quitting, talk to your doctor about stop-smoking programs and medicines. These can increase your chances of quitting for good. · Limit alcohol to 2 drinks a day for men and 1 drink a day for women. Too much alcohol can cause health problems. If you have a BMI higher than 25  · Your doctor may do other tests to check your risk for weight-related health problems. This may include measuring the distance around your waist. A waist measurement of more than 40 inches in men or 35 inches in women can increase the risk of weight-related health problems. · Talk with your doctor about steps you can take to stay healthy or improve your health. You may need to make lifestyle changes to lose weight and stay healthy, such as changing your diet and getting regular exercise. If you have a BMI lower than 18.5  · Your doctor may do other tests to check your risk for health problems. · Talk with your doctor about steps you can take to stay healthy or improve your health. You may need to make lifestyle changes to gain or maintain weight and stay healthy, such as getting more healthy foods in your diet and doing exercises to build muscle. Where can you learn more? Go to http://dez-jasmyn.info/. Enter S176 in the search box to learn more about \"Body Mass Index: Care Instructions. \"  Current as of: March 28, 2019  Content Version: 12.1  © 4025-3380 [employer]. Care instructions adapted under license by [employer] (which disclaims liability or warranty for this information). If you have questions about a medical condition or this instruction, always ask your healthcare professional. [address] any warranty or liability for your use of this information.

## 2019-12-20 DIAGNOSIS — E55.9 VITAMIN D DEFICIENCY: ICD-10-CM

## 2019-12-20 RX ORDER — ERGOCALCIFEROL 1.25 MG/1
50000 CAPSULE ORAL
Qty: 12 CAP | Refills: 3 | Status: SHIPPED | OUTPATIENT
Start: 2019-12-20 | End: 2021-10-04

## 2019-12-20 NOTE — TELEPHONE ENCOUNTER
Last Visit: 8/8/19 with MD Beau Reyes  Next Appointment: 2/11/20 with MD Beau Reyes  Previous Refill Encounter(s): 7/3/19 #4 with 6 refills    Requested Prescriptions     Pending Prescriptions Disp Refills    ergocalciferol (ERGOCALCIFEROL) 50,000 unit capsule 12 Cap 3     Sig: Take 1 Cap by mouth every seven (7) days.

## 2020-02-11 ENCOUNTER — OFFICE VISIT (OUTPATIENT)
Dept: INTERNAL MEDICINE CLINIC | Age: 40
End: 2020-02-11

## 2020-02-11 VITALS
SYSTOLIC BLOOD PRESSURE: 116 MMHG | DIASTOLIC BLOOD PRESSURE: 77 MMHG | OXYGEN SATURATION: 99 % | TEMPERATURE: 99.5 F | RESPIRATION RATE: 14 BRPM | HEART RATE: 112 BPM | HEIGHT: 76 IN | BODY MASS INDEX: 38.36 KG/M2 | WEIGHT: 315 LBS

## 2020-02-11 DIAGNOSIS — I10 ESSENTIAL HYPERTENSION: Primary | ICD-10-CM

## 2020-02-11 DIAGNOSIS — E66.01 MORBID OBESITY (HCC): ICD-10-CM

## 2020-02-11 RX ORDER — FAMOTIDINE 20 MG/1
TABLET, FILM COATED ORAL
COMMUNITY
Start: 2020-02-01

## 2020-02-11 RX ORDER — PROMETHAZINE HYDROCHLORIDE 25 MG/1
TABLET ORAL
COMMUNITY
Start: 2020-02-01 | End: 2020-09-18

## 2020-02-11 RX ORDER — OXYCODONE AND ACETAMINOPHEN 5; 325 MG/1; MG/1
TABLET ORAL
COMMUNITY
Start: 2020-02-01 | End: 2020-02-11 | Stop reason: ALTCHOICE

## 2020-02-11 RX ORDER — LOSARTAN POTASSIUM 50 MG/1
25 TABLET ORAL DAILY
Qty: 90 TAB | Refills: 1 | Status: SHIPPED | OUTPATIENT
Start: 2020-02-11 | End: 2020-09-18

## 2020-02-11 RX ORDER — AMOXICILLIN AND CLAVULANATE POTASSIUM 500; 125 MG/1; MG/1
TABLET, FILM COATED ORAL
COMMUNITY
End: 2020-02-11 | Stop reason: ALTCHOICE

## 2020-02-11 RX ORDER — PROMETHAZINE HYDROCHLORIDE 25 MG/1
TABLET ORAL
COMMUNITY
End: 2020-02-11 | Stop reason: SDUPTHER

## 2020-02-11 NOTE — PROGRESS NOTES
INTERNISTS [facility]:  2/11/2020, MRN: [id number]      Robyn Trotter is a 41 y.o. male and presents to clinic for Hypertension (follow up  ROOM   11)    Subjective: The patient is a 47-year-old male with history of CONDE, hyperuricemia, vitamin D deficiency, obesity, GERD, and hypertension with white coat hypertension. 1. Obeisty: S/p sleeve surgery. He is scheduled to f/u with his surgeon, Brent Whiteside. He is losing weight. No abdominal pain. No diarrhea/vomintg. +Liquid diet. 2. HTN: Bp is 116/77. He is taking verapamil and losartan. No adverse side effects except some dizziness with standing up too quickly. No CP/SOB. No h/o arrhythmia. No h/o palpitations. Patient Active Problem List    Diagnosis Date Noted    Impaired glucose tolerance 07/03/2019    CONDE (nonalcoholic steatohepatitis) 08/31/2018    Hyperuricemia 08/31/2018    Vitamin D deficiency 08/31/2018    Morbid obesity ([facility] [address]     GERD (gastroesophageal reflux disease)     Hypertension 12/01/2015       Current Outpatient Medications   Medication Sig Dispense Refill    famotidine (PEPCID) 20 mg tablet TAKE 1 TABLET BY MOUTH TWICE DAILY      verapamil ER (VERELAN) 240 mg ER capsule TAKE 1 CAPSULE BY MOUTH EVERY DAY 90 Cap 1    losartan (COZAAR) 50 mg tablet TAKE 1 TABLET BY MOUTH EVERY DAY 90 Tab 0    ergocalciferol (ERGOCALCIFEROL) 50,000 unit capsule Take 1 Cap by mouth every seven (7) days.  12 Cap 3    promethazine (PHENERGAN) 25 mg tablet TAKE 1/2 TO 1 TABLET BY MOUTH EVERY 6 TO 8 HOURS AS NEEDED FOR NAUSEA         Allergies   Allergen Reactions    Lozol [Indapamide] Other (comments)     Severe cramping in stomach area    Amoxicillin Diarrhea       Past Medical History:   Diagnosis Date    GERD (gastroesophageal reflux disease)     uses OTC meds    Hypertension     no meds as of Jan 2018    Morbid obesity ([address]     Morbid obesity with body mass index of 40.0-49.9 ([facility] [address]        Past Surgical History:   Procedure Laterality Date  HX KNEE ARTHROSCOPY      HX ORTHOPAEDIC      ankle surgery / no hardware    HX TONSILLECTOMY         Family History   Problem Relation Age of Onset    Cancer Mother     Hypertension Mother     Diabetes Mother     Diabetes Father     Hypertension Father        Social History     Tobacco Use    Smoking status: Never Smoker    Smokeless tobacco: Never Used   Substance Use Topics    Alcohol use: Not Currently     Alcohol/week: 6.0 standard drinks     Types: 6 Shots of liquor per week       ROS   Review of Systems   Constitutional: Negative for chills and fever. HENT: Negative for ear pain and sore throat. Eyes: Negative for blurred vision and pain. Respiratory: Negative for cough and shortness of breath. Cardiovascular: Negative for chest pain. Gastrointestinal: Negative for abdominal pain, blood in stool and melena. Sx resolved   Genitourinary: Negative for dysuria and hematuria. Musculoskeletal: Negative for joint pain and myalgias. Skin: Negative for rash. Neurological: Positive for dizziness (off/on). Negative for tingling, focal weakness and headaches. Endo/Heme/Allergies: Does not bruise/bleed easily. Psychiatric/Behavioral: Negative for substance abuse. Objective     Vitals:    02/11/20 0905   BP: 116/77   Pulse: (!) 112   Resp: 14   Temp: 99.5 °F (37.5 °C)   TempSrc: Oral   SpO2: 99%   Weight: (!) 380 lb (172.4 kg)   Height: 6' 4\" (1.93 m)   PainSc:   4   PainLoc: Neck       Physical Exam  Vitals signs reviewed. Constitutional:       Appearance: Normal appearance. HENT:      Head: Normocephalic and atraumatic. Right Ear: External ear normal.      Left Ear: External ear normal.      Nose: Nose normal.      Mouth/Throat:      Pharynx: No oropharyngeal exudate or posterior oropharyngeal erythema. Eyes:      Extraocular Movements: Extraocular movements intact.       Conjunctiva/sclera: Conjunctivae normal.      Pupils: Pupils are equal, round, and reactive to light. Neck:      Musculoskeletal: Neck supple. Cardiovascular:      Pulses: Normal pulses. Heart sounds: Normal heart sounds. Pulmonary:      Effort: Pulmonary effort is normal.      Breath sounds: Normal breath sounds. Abdominal:      General: Abdomen is flat. Bowel sounds are normal. There is no distension. Palpations: Abdomen is soft. Tenderness: There is no abdominal tenderness. Musculoskeletal:         General: No swelling (BUE) or tenderness (BUE). Lymphadenopathy:      Cervical: No cervical adenopathy. Skin:     General: Skin is warm and dry. Findings: No erythema. Neurological:      General: No focal deficit present. Mental Status: He is alert and oriented to person, place, and time.       Gait: Gait normal.   Psychiatric:         Mood and Affect: Mood normal.         LABS   Data Review:   Lab Results   Component Value Date/Time    WBC 7.1 06/26/2019 08:48 AM    HGB 14.4 06/26/2019 08:48 AM    HCT 43.9 06/26/2019 08:48 AM    PLATELET 494 16/77/4399 08:48 AM    MCV 81.4 06/26/2019 08:48 AM       Lab Results   Component Value Date/Time    Sodium 142 06/26/2019 08:48 AM    Potassium 4.1 06/26/2019 08:48 AM    Chloride 106 06/26/2019 08:48 AM    CO2 27 06/26/2019 08:48 AM    Anion gap 9 06/26/2019 08:48 AM    Glucose 96 06/26/2019 08:48 AM    BUN 15 06/26/2019 08:48 AM    Creatinine 1.14 06/26/2019 08:48 AM    BUN/Creatinine ratio 13 06/26/2019 08:48 AM    GFR est AA >60 06/26/2019 08:48 AM    GFR est non-AA >60 06/26/2019 08:48 AM    Calcium 8.3 (L) 06/26/2019 08:48 AM       Lab Results   Component Value Date/Time    Cholesterol, total 182 06/26/2019 08:48 AM    HDL Cholesterol 71 (H) 06/26/2019 08:48 AM    LDL, calculated 92.8 06/26/2019 08:48 AM    VLDL, calculated 18.2 06/26/2019 08:48 AM    Triglyceride 91 06/26/2019 08:48 AM    CHOL/HDL Ratio 2.6 06/26/2019 08:48 AM       Lab Results   Component Value Date/Time    Hemoglobin A1c 5.9 (H) 06/26/2019 08:48 AM Assessment/Plan:   Essential hypertension: +Having dizzy spells. BP is low today at 116/77. +Losing weight. +Obesity.   - Reducing his Cozaar to 25 mg daily instead of 50 mg daily.  -Continue with verapamil.  - Return to clinic if he has persistent dizzy spells.  -Return clinic for BP check.  -Continue with bariatric diet. I encouraged him to continue his weight loss journey. I will recheck his weight at his follow-up appointment. ORDERS:  - losartan (COZAAR) 50 mg tablet; Take 0.5 Tabs by mouth daily. Dispense: 90 Tab; Refill: 1      Health Maintenance Due   Topic Date Due    DTaP/Tdap/Td series (1 - Tdap) 02/07/1989     Lab review: labs are reviewed in the EHR    I have discussed the diagnosis with the patient and the intended plan as seen in the above orders. The patient has received an after-visit summary and questions were answered concerning future plans. I have discussed medication side effects and warnings with the patient as well. I have reviewed the plan of care with the patient, accepted their input and they are in agreement with the treatment goals. All questions were answered. The patient understands the plan of care. Handouts provided today with above information. Pt instructed if symptoms worsen to call the office or report to the ED for continued care. Greater than 50% of the visit time was spent in counseling and/or coordination of care. Voice recognition was used to generate this report, which may have resulted in some phonetic based errors in grammar and contents. Even though attempts were made to correct all the mistakes, some may have been missed, and remained in the body of the document.           Omayra Palma MD

## 2020-02-11 NOTE — PROGRESS NOTES
Yazmin Elders presents today for   Chief Complaint   Patient presents with    Hypertension     follow up  ROOM   11       Is someone accompanying this pt? no    Is the patient using any DME equipment during OV? no    Depression Screening:  3 most recent PHQ Screens 2/11/2020   Little interest or pleasure in doing things Not at all   Feeling down, depressed, irritable, or hopeless Not at all   Total Score PHQ 2 0       Learning Assessment:  Learning Assessment 2/11/2020   PRIMARY LEARNER Patient   HIGHEST LEVEL OF EDUCATION - PRIMARY LEARNER  4 YEARS OF COLLEGE   BARRIERS PRIMARY LEARNER NONE   CO-LEARNER CAREGIVER No   PRIMARY LANGUAGE ENGLISH   LEARNER PREFERENCE PRIMARY DEMONSTRATION   ANSWERED BY patient   RELATIONSHIP SELF       Abuse Screening:  Abuse Screening Questionnaire 2/11/2020   Do you ever feel afraid of your partner? N   Are you in a relationship with someone who physically or mentally threatens you? N   Is it safe for you to go home? Y       Fall Risk  No flowsheet data found. Health Maintenance reviewed and discussed and ordered per Provider. Coordination of Care:  1. Have you been to the ER, urgent care clinic since your last visit? Hospitalized since your last visit? no    2. Have you seen or consulted any other health care providers outside of the [address] [place] [address] since your last visit? Include any pap smears or colon screening.  Yes,  Bariatric Surgery on 02- with Dr Rowdy Pierson

## 2020-02-11 NOTE — PATIENT INSTRUCTIONS
Body Mass Index: Care Instructions  Your Care Instructions    Body mass index (BMI) can help you see if your weight is raising your risk for health problems. It uses a formula to compare how much you weigh with how tall you are. · A BMI lower than 18.5 is considered underweight. · A BMI between 18.5 and 24.9 is considered healthy. · A BMI between 25 and 29.9 is considered overweight. A BMI of 30 or higher is considered obese. If your BMI is in the normal range, it means that you have a lower risk for weight-related health problems. If your BMI is in the overweight or obese range, you may be at increased risk for weight-related health problems, such as high blood pressure, heart disease, stroke, arthritis or joint pain, and diabetes. If your BMI is in the underweight range, you may be at increased risk for health problems such as fatigue, lower protection (immunity) against illness, muscle loss, bone loss, hair loss, and hormone problems. BMI is just one measure of your risk for weight-related health problems. You may be at higher risk for health problems if you are not active, you eat an unhealthy diet, or you drink too much alcohol or use tobacco products. Follow-up care is a key part of your treatment and safety. Be sure to make and go to all appointments, and call your doctor if you are having problems. It's also a good idea to know your test results and keep a list of the medicines you take. How can you care for yourself at home? · Practice healthy eating habits. This includes eating plenty of fruits, vegetables, whole grains, lean protein, and low-fat dairy. · If your doctor recommends it, get more exercise. Walking is a good choice. Bit by bit, increase the amount you walk every day. Try for at least 30 minutes on most days of the week. · Do not smoke. Smoking can increase your risk for health problems. If you need help quitting, talk to your doctor about stop-smoking programs and medicines. These can increase your chances of quitting for good. · Limit alcohol to 2 drinks a day for men and 1 drink a day for women. Too much alcohol can cause health problems. If you have a BMI higher than 25  · Your doctor may do other tests to check your risk for weight-related health problems. This may include measuring the distance around your waist. A waist measurement of more than 40 inches in men or 35 inches in women can increase the risk of weight-related health problems. · Talk with your doctor about steps you can take to stay healthy or improve your health. You may need to make lifestyle changes to lose weight and stay healthy, such as changing your diet and getting regular exercise. If you have a BMI lower than 18.5  · Your doctor may do other tests to check your risk for health problems. · Talk with your doctor about steps you can take to stay healthy or improve your health. You may need to make lifestyle changes to gain or maintain weight and stay healthy, such as getting more healthy foods in your diet and doing exercises to build muscle. Where can you learn more? Go to http://dez-jasmyn.info/. Enter S176 in the search box to learn more about \"Body Mass Index: Care Instructions. \"  Current as of: March 28, 2019  Content Version: 12.2  © 2708-2295 [employer]. Care instructions adapted under license by [employer] (which disclaims liability or warranty for this information). If you have questions about a medical condition or this instruction, always ask your healthcare professional. [address] any warranty or liability for your use of this information.

## 2020-04-01 DIAGNOSIS — I10 ESSENTIAL HYPERTENSION: ICD-10-CM

## 2020-04-01 DIAGNOSIS — E66.01 MORBID OBESITY (HCC): Primary | ICD-10-CM

## 2020-04-01 DIAGNOSIS — R73.02 IMPAIRED GLUCOSE TOLERANCE: ICD-10-CM

## 2020-04-08 ENCOUNTER — APPOINTMENT (OUTPATIENT)
Dept: INTERNAL MEDICINE CLINIC | Age: 40
End: 2020-04-08

## 2020-04-08 DIAGNOSIS — I10 ESSENTIAL HYPERTENSION: ICD-10-CM

## 2020-04-08 DIAGNOSIS — E66.01 MORBID OBESITY (HCC): ICD-10-CM

## 2020-04-08 DIAGNOSIS — R73.02 IMPAIRED GLUCOSE TOLERANCE: ICD-10-CM

## 2020-04-09 LAB
ALBUMIN SERPL-MCNC: 3.8 G/DL (ref 4–5)
ALBUMIN/GLOB SERPL: 1.5 {RATIO} (ref 1.2–2.2)
ALP SERPL-CCNC: 77 IU/L (ref 39–117)
ALT SERPL-CCNC: 35 IU/L (ref 0–44)
AST SERPL-CCNC: 24 IU/L (ref 0–40)
BASOPHILS # BLD AUTO: 0 X10E3/UL (ref 0–0.2)
BASOPHILS NFR BLD AUTO: 1 %
BILIRUB SERPL-MCNC: 0.4 MG/DL (ref 0–1.2)
BUN SERPL-MCNC: 10 MG/DL (ref 6–24)
BUN/CREAT SERPL: 10 (ref 9–20)
CALCIUM SERPL-MCNC: 8.6 MG/DL (ref 8.7–10.2)
CHLORIDE SERPL-SCNC: 104 MMOL/L (ref 96–106)
CHOLEST SERPL-MCNC: 151 MG/DL (ref 100–199)
CO2 SERPL-SCNC: 23 MMOL/L (ref 20–29)
CREAT SERPL-MCNC: 1.05 MG/DL (ref 0.76–1.27)
EOSINOPHIL # BLD AUTO: 0.2 X10E3/UL (ref 0–0.4)
EOSINOPHIL NFR BLD AUTO: 3 %
ERYTHROCYTE [DISTWIDTH] IN BLOOD BY AUTOMATED COUNT: 15.8 % (ref 11.6–15.4)
GLOBULIN SER CALC-MCNC: 2.6 G/DL (ref 1.5–4.5)
GLUCOSE SERPL-MCNC: 77 MG/DL (ref 65–99)
HBA1C MFR BLD: 5.1 % (ref 4.8–5.6)
HCT VFR BLD AUTO: 38 % (ref 37.5–51)
HDLC SERPL-MCNC: 47 MG/DL
HGB BLD-MCNC: 11.9 G/DL (ref 13–17.7)
IMM GRANULOCYTES # BLD AUTO: 0 X10E3/UL (ref 0–0.1)
IMM GRANULOCYTES NFR BLD AUTO: 0 %
INTERPRETATION, 910389: NORMAL
LDLC SERPL CALC-MCNC: 85 MG/DL (ref 0–99)
LYMPHOCYTES # BLD AUTO: 2.1 X10E3/UL (ref 0.7–3.1)
LYMPHOCYTES NFR BLD AUTO: 40 %
MCH RBC QN AUTO: 24.4 PG (ref 26.6–33)
MCHC RBC AUTO-ENTMCNC: 31.3 G/DL (ref 31.5–35.7)
MCV RBC AUTO: 78 FL (ref 79–97)
MONOCYTES # BLD AUTO: 0.4 X10E3/UL (ref 0.1–0.9)
MONOCYTES NFR BLD AUTO: 7 %
NEUTROPHILS # BLD AUTO: 2.6 X10E3/UL (ref 1.4–7)
NEUTROPHILS NFR BLD AUTO: 49 %
PLATELET # BLD AUTO: 259 X10E3/UL (ref 150–450)
POTASSIUM SERPL-SCNC: 3.7 MMOL/L (ref 3.5–5.2)
PROT SERPL-MCNC: 6.4 G/DL (ref 6–8.5)
RBC # BLD AUTO: 4.87 X10E6/UL (ref 4.14–5.8)
SODIUM SERPL-SCNC: 146 MMOL/L (ref 134–144)
TRIGL SERPL-MCNC: 97 MG/DL (ref 0–149)
VLDLC SERPL CALC-MCNC: 19 MG/DL (ref 5–40)
WBC # BLD AUTO: 5.3 X10E3/UL (ref 3.4–10.8)

## 2020-04-09 NOTE — PROGRESS NOTES
I will discuss his results with him at his upcoming appointment. His hemoglobin is mildly low at 11.9. Normal would be 13-17. The remainder of his CBC is unremarkable except for an elevated RDW of 15.8 and an MCV of 78. His total cholesterol was 151. Triglycerides are 97. HDL is 47. His LDL is 85. His A1c is normal at 5.1. His CMP is unremarkable except for a sodium of 146, calcium of 8.6, and mildly low albumin of 3.8. He will need iron rx and a FIT test for completeness. TFTs?     Dr. Valencia Barton  [facility], [address] [place] [address], [address] [place], [place] [address] [facility] [address] [facility] [address]  Phone: [phone]  Fax: [phone]

## 2020-04-15 ENCOUNTER — VIRTUAL VISIT (OUTPATIENT)
Dept: INTERNAL MEDICINE CLINIC | Age: 40
End: 2020-04-15

## 2020-04-15 DIAGNOSIS — R14.0 BLOATING: ICD-10-CM

## 2020-04-15 DIAGNOSIS — I10 ESSENTIAL HYPERTENSION: ICD-10-CM

## 2020-04-15 DIAGNOSIS — D64.9 ANEMIA, UNSPECIFIED TYPE: Primary | ICD-10-CM

## 2020-04-15 RX ORDER — FERROUS SULFATE 325(65) MG
325 TABLET, DELAYED RELEASE (ENTERIC COATED) ORAL DAILY
Qty: 30 TAB | Refills: 5 | Status: SHIPPED | OUTPATIENT
Start: 2020-04-15 | End: 2020-09-18

## 2020-04-15 RX ORDER — ASCORBIC ACID 250 MG
250 TABLET ORAL DAILY
Qty: 30 TAB | Refills: 5 | Status: SHIPPED | OUTPATIENT
Start: 2020-04-15 | End: 2020-09-18

## 2020-04-15 RX ORDER — DOCUSATE SODIUM 100 MG/1
100 CAPSULE, LIQUID FILLED ORAL 2 TIMES DAILY
Qty: 60 CAP | Refills: 5 | Status: SHIPPED | OUTPATIENT
Start: 2020-04-15 | End: 2020-09-18

## 2020-04-15 NOTE — PROGRESS NOTES
Damian Turner is a 41 y.o. male who was seen by synchronous (real-time) audio-video technology on 4/15/2020. Assessment & Plan:   1. Essential hypertension  - Ok to stop verapamil since he already d/c it. C/w losartan. - RTC for a BP check    2. Anemia:   - Ordering iron, vitamin C, and docusate  - RTC for f/u labs including a CBC, iron studies, and TFTs    ORDERS:  - ferrous sulfate (IRON) 325 mg (65 mg iron) EC tablet; Take 1 Tab by mouth daily. Dispense: 30 Tab; Refill: 5  - ascorbic acid, vitamin C, (VITAMIN C) 250 mg tablet; Take 1 Tab by mouth daily. Dispense: 30 Tab; Refill: 5  - docusate sodium (COLACE) 100 mg capsule; Take 1 Cap by mouth two (2) times a day. Dispense: 60 Cap; Refill: 5    3. Bloating/Constipation:  - I encouraged him to reach out to his Bariatric Surgery team for additional dietary recommendations including for him to reach out to the nutritionist.       Lab review: labs are reviewed in the EHR and ordered as mentioned above     I have discussed the diagnosis with the patient and the intended plan as seen in the above orders. I have discussed medication side effects and warnings with the patient as well. I have reviewed the plan of care with the patient, accepted their input and they are in agreement with the treatment goals. All questions were answered. The patient understands the plan of care. Pt instructed if symptoms worsen to call the office or report to the ED for continued care. Greater than 50% of the visit time was spent in counseling and/or coordination of care. Voice recognition was used to generate this report, which may have resulted in some phonetic based errors in grammar and contents. Even though attempts were made to correct all the mistakes, some may have been missed, and remained in the body of the document.       Subjective:   Damian Turner was seen for   Chief Complaint   Patient presents with    Follow-up    Hypertension    Labs     completed on 4-8-20       The patient is a 42-year-old male with history of CONDE, hyperuricemia, vitamin D deficiency, obesity, GERD, and hypertension with white coat hypertension. 1. Anemia:  His most recent labs show that his hemoglobin is 11.9. The remainder of his CBC is unremarkable except for an elevated RDW of 15.8 and MCV of 78, suggestive of underlying iron deficiency. No bleeding. 2. HTN: At his last apt, he was taking verapamil and losartan. His cozaar was reduced from 50mg daily to 25mg daily due to c/o dizzy spells he reported at his last apt. Additionally, his BP at his last apt was low. Since then, he reports: SOB; he read that verapamil can cause this so he stopped taking it. Sx resolved. . Home BP checks: BPs at home are 135/86 on just losartan. His weight is 235lbs. Wt Readings from Last 3 Encounters:   02/11/20 (!) 380 lb (172.4 kg)   08/08/19 (!) 384 lb 12.8 oz (174.5 kg)   07/03/19 (!) 385 lb 6.4 oz (174.8 kg)     3. Constipation/Bloating: He reports increased bloating and constipation since his last apt. He reported these sx to [facility], his bariatric surgeon who recommended some dietary changes. The pt is sticking well to his bariatric diet but he is struggling to find fiber rich foods to keep his BMs regular. His weight loss journey has slowed down per his hx. S/p gastric sleeve surgery with . Prior to Admission medications    Medication Sig Start Date End Date Taking? Authorizing Provider   famotidine (PEPCID) 20 mg tablet TAKE 1 TABLET BY MOUTH TWICE DAILY 2/1/20  Yes Provider, Historical   losartan (COZAAR) 50 mg tablet Take 0.5 Tabs by mouth daily. 2/11/20  Yes Chris Fuchs MD   ergocalciferol (ERGOCALCIFEROL) 50,000 unit capsule Take 1 Cap by mouth every seven (7) days.  12/20/19  Yes Miguel Angel Martinez MD   promethazine (PHENERGAN) 25 mg tablet TAKE 1/2 TO 1 TABLET BY MOUTH EVERY 6 TO 8 HOURS AS NEEDED FOR NAUSEA 2/1/20   Provider, Historical   verapamil ER (VERELAN) 240 mg ER capsule TAKE 1 CAPSULE BY MOUTH EVERY DAY 2/8/20   Anson Bass MD     Allergies   Allergen Reactions    Lozol [Indapamide] Other (comments)     Severe cramping in stomach area    Amoxicillin Diarrhea     Past Medical History:   Diagnosis Date    GERD (gastroesophageal reflux disease)     uses OTC meds    Hypertension     no meds as of Jan 2018    Morbid [facility] ([facility] [address]     Morbid obesity with body mass index of 40.0-49.9 ([facility] [address]      Past Surgical History:   Procedure Laterality Date    HX KNEE ARTHROSCOPY      HX ORTHOPAEDIC      ankle surgery / no hardware    HX TONSILLECTOMY       Family History   Problem Relation Age of Onset    Cancer Mother     Hypertension Mother     Diabetes Mother     Diabetes Father     Hypertension Father      Social History     Socioeconomic History    Marital status:      Spouse name: Not on file    Number of children: Not on file    Years of education: Not on file    Highest education level: Not on file   Tobacco Use    Smoking status: Never Smoker    Smokeless tobacco: Never Used   Substance and Sexual Activity    Alcohol use: Not Currently     Alcohol/week: 6.0 standard drinks     Types: 6 Shots of liquor per week    Drug use: No    Sexual activity: Yes     Partners: Female     Birth control/protection: None   Social History Narrative    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  No HA  CV: No CP  Resp: No cough/SOB  GI: See HPI  : No hematuria/dysuria  Derm: No rash  Neuro: No new paresthesias/weakness  Musc: No new myalgias/jt pain  Psych: No depression sx      Objective:     General: alert, cooperative, no distress   Mental  status: mental status: alert, oriented to person, place, and time, normal mood, behavior, speech, dress, motor activity, and thought processes   Resp: resp: normal effort and no respiratory distress   Neuro: neuro: no gross deficits   Skin: skin: no discoloration or lesions of concern on visible areas     LABS:  Lab Results   Component Value Date/Time    Sodium 146 (H) 04/08/2020 08:13 AM    Potassium 3.7 04/08/2020 08:13 AM    Chloride 104 04/08/2020 08:13 AM    CO2 23 04/08/2020 08:13 AM    Anion gap 9 06/26/2019 08:48 AM    Glucose 77 04/08/2020 08:13 AM    BUN 10 04/08/2020 08:13 AM    Creatinine 1.05 04/08/2020 08:13 AM    BUN/Creatinine ratio 10 04/08/2020 08:13 AM    GFR est  04/08/2020 08:13 AM    GFR est non-AA 87 04/08/2020 08:13 AM    Calcium 8.6 (L) 04/08/2020 08:13 AM       Lab Results   Component Value Date/Time    Cholesterol, total 151 04/08/2020 08:13 AM    HDL Cholesterol 47 04/08/2020 08:13 AM    LDL, calculated 85 04/08/2020 08:13 AM    VLDL, calculated 19 04/08/2020 08:13 AM    Triglyceride 97 04/08/2020 08:13 AM    CHOL/HDL Ratio 2.6 06/26/2019 08:48 AM       Lab Results   Component Value Date/Time    WBC 5.3 04/08/2020 08:13 AM    HGB 11.9 (L) 04/08/2020 08:13 AM    HCT 38.0 04/08/2020 08:13 AM    PLATELET 556 85/03/5483 08:13 AM    MCV 78 (L) 04/08/2020 08:13 AM       Lab Results   Component Value Date/Time    Hemoglobin A1c 5.1 04/08/2020 08:13 AM       Lab Results   Component Value Date/Time    TSH 0.67 01/19/2016 10:19 AM    TSH 0.73 01/13/2016 10:00 AM           Due to this being a TeleHealth  evaluation, many elements of the physical examination are unable to be assessed. The pt was seen by synchronous (real-time) audio-video technology, and/or her healthcare decision maker, is aware that this patient-initiated, Telehealth encounter is a billable service, with coverage as determined by her insurance carrier. She is aware that she may receive a bill and has provided verbal consent to proceed: Yes. The pt is being evaluated by a video visit encounter for concerns as above. A caregiver was present when appropriate.  Due to this being a TeleHealth encounter (During NSINX-66 public health emergency), evaluation of the following organ systems was limited: Vitals/Constitutional/EENT/Resp/CV/GI//MS/Neuro/Skin/Heme-Lymph-Imm. Pursuant to the emergency declaration under the [facility] [place] [address] waiver authority and the [employer] and [employer] Act, this Virtual  Visit was conducted, with patient's (and/or legal guardian's) consent, to reduce the patient's risk of exposure to COVID-19 and provide necessary medical care. Services were provided through a video synchronous discussion virtually to substitute for in-person clinic visit. Patient and provider were located at their individual homes. We discussed the expected course, resolution and complications of the diagnosis(es) in detail. Medication risks, benefits, costs, interactions, and alternatives were discussed as indicated. I advised him to contact the office if his condition worsens, changes or fails to improve as anticipated. He expressed understanding with the diagnosis(es) and plan.      Demarco Vargas MD

## 2020-04-15 NOTE — PROGRESS NOTES
Otilia Gonzalez presents today for   Chief Complaint   Patient presents with    Follow-up    Hypertension    Labs     completed on 4-8-20                Coordination of Care:  1. Have you been to the ER, urgent care clinic since your last visit? Hospitalized since your last visit? no    2. Have you seen or consulted any other health care providers outside of the [address] [place] [address] since your last visit? Include any pap smears or colon screening. no      Advance Directive:  1. Do you have an advance directive in place? Patient Reply:no    2. If not, would you like material regarding how to put one in place?  Patient Reply: no

## 2020-09-11 ENCOUNTER — HOSPITAL ENCOUNTER (OUTPATIENT)
Dept: LAB | Age: 40
Discharge: HOME | End: 2020-09-11
Payer: COMMERCIAL

## 2020-09-11 ENCOUNTER — APPOINTMENT (OUTPATIENT)
Dept: INTERNAL MEDICINE CLINIC | Age: 40
End: 2020-09-11

## 2020-09-11 DIAGNOSIS — D64.9 ANEMIA, UNSPECIFIED TYPE: ICD-10-CM

## 2020-09-11 LAB
BASOPHILS # BLD: 0 K/UL (ref 0–0.1)
BASOPHILS NFR BLD: 0 % (ref 0–2)
DIFFERENTIAL METHOD BLD: ABNORMAL
EOSINOPHIL # BLD: 0.1 K/UL (ref 0–0.4)
EOSINOPHIL NFR BLD: 2 % (ref 0–5)
ERYTHROCYTE [DISTWIDTH] IN BLOOD BY AUTOMATED COUNT: 17 % (ref 11.6–14.5)
FERRITIN SERPL-MCNC: 178 NG/ML (ref 8–388)
HCT VFR BLD AUTO: 47.6 % (ref 36–48)
HGB BLD-MCNC: 15.2 G/DL (ref 13–16)
IRON SATN MFR SERPL: 30 % (ref 20–50)
IRON SERPL-MCNC: 103 UG/DL (ref 50–175)
LYMPHOCYTES # BLD: 2.2 K/UL (ref 0.9–3.6)
LYMPHOCYTES NFR BLD: 35 % (ref 21–52)
MCH RBC QN AUTO: 26.5 PG (ref 24–34)
MCHC RBC AUTO-ENTMCNC: 31.9 G/DL (ref 31–37)
MCV RBC AUTO: 83.1 FL (ref 74–97)
MONOCYTES # BLD: 0.4 K/UL (ref 0.05–1.2)
MONOCYTES NFR BLD: 6 % (ref 3–10)
NEUTS SEG # BLD: 3.8 K/UL (ref 1.8–8)
NEUTS SEG NFR BLD: 57 % (ref 40–73)
PLATELET # BLD AUTO: 264 K/UL (ref 135–420)
PMV BLD AUTO: 11 FL (ref 9.2–11.8)
RBC # BLD AUTO: 5.73 M/UL (ref 4.7–5.5)
T4 FREE SERPL-MCNC: 1.1 NG/DL (ref 0.7–1.5)
TIBC SERPL-MCNC: 348 UG/DL (ref 250–450)
TSH SERPL DL<=0.05 MIU/L-ACNC: 0.55 UIU/ML (ref 0.36–3.74)
WBC # BLD AUTO: 6.5 K/UL (ref 4.6–13.2)

## 2020-09-11 PROCEDURE — 82728 ASSAY OF FERRITIN: CPT

## 2020-09-11 PROCEDURE — 85025 COMPLETE CBC W/AUTO DIFF WBC: CPT

## 2020-09-11 PROCEDURE — 36415 COLL VENOUS BLD VENIPUNCTURE: CPT

## 2020-09-11 PROCEDURE — 84439 ASSAY OF FREE THYROXINE: CPT

## 2020-09-11 PROCEDURE — 83540 ASSAY OF IRON: CPT

## 2020-09-14 NOTE — PROGRESS NOTES
I will discuss his results with him at his upcoming appointment. His CBC does not show any anemia. TFTs are normal.  His ferritin is normal.  His iron studies are unremarkable.     Dr. Mary Rasmussen  [facility], [address] [place] [address], [address] [place] [address] Str.  Phone: [phone]  Fax: [phone]

## 2020-09-18 ENCOUNTER — VIRTUAL VISIT (OUTPATIENT)
Dept: INTERNAL MEDICINE CLINIC | Age: 40
End: 2020-09-18

## 2020-09-18 DIAGNOSIS — R73.02 IMPAIRED GLUCOSE TOLERANCE: ICD-10-CM

## 2020-09-18 DIAGNOSIS — I10 ESSENTIAL HYPERTENSION: Primary | ICD-10-CM

## 2020-09-18 DIAGNOSIS — E55.9 VITAMIN D DEFICIENCY: ICD-10-CM

## 2020-09-18 DIAGNOSIS — M25.559 CHRONIC HIP PAIN, UNSPECIFIED LATERALITY: ICD-10-CM

## 2020-09-18 DIAGNOSIS — G89.29 CHRONIC HIP PAIN, UNSPECIFIED LATERALITY: ICD-10-CM

## 2020-09-18 DIAGNOSIS — Z86.2 HISTORY OF ANEMIA: ICD-10-CM

## 2020-09-18 RX ORDER — LOSARTAN POTASSIUM 50 MG/1
50 TABLET ORAL DAILY
Qty: 90 TAB | Refills: 1
Start: 2020-09-18 | End: 2021-09-15

## 2020-09-18 NOTE — PROGRESS NOTES
Jovani Beltran is a 41 y.o. male who was seen by synchronous (real-time) audio-video technology on 9/18/2020. Assessment & Plan:   1. Anemia: His recent CBC is WNL  - Observation. D/c iron treatment regimen.   - Rechecking labs in 6 months. 2. HTN: Stable. +H/o prediabetes. - C/w rx as prescribed. - I encouraged him to continue his weight loss journey, exercising as tolerated. - Checking labs in 6 months  - F/u with me in the office in 6 months for an in office visit/BP check. 3. Chronic Hip Pain: Likely from OA  - Activity as tolerated. - He declines a referral to Orthopedics at this time.  - Ok to take tylenol prn.    4. Vitamin D Deficiency: Stable. - Checking labs in 6 months. - C/w rx as prescribed for now. 5. Health Maintenance:  - I strongly encouraged him to get the flu shot. He declines today but will think about it. Lab review: labs are reviewed in the EHR and ordered as mentioned above     I have discussed the diagnosis with the patient and the intended plan as seen in the above orders. I have discussed medication side effects and warnings with the patient as well. I have reviewed the plan of care with the patient, accepted their input and they are in agreement with the treatment goals. All questions were answered. The patient understands the plan of care. Pt instructed if symptoms worsen to call the office or report to the ED for continued care. Greater than 50% of the visit time was spent in counseling and/or coordination of care. Voice recognition was used to generate this report, which may have resulted in some phonetic based errors in grammar and contents. Even though attempts were made to correct all the mistakes, some may have been missed, and remained in the body of the document.       Subjective:   Jovani Beltran was seen for   Chief Complaint   Patient presents with    Other     The patient is a 49-year-old male with history of CONDE, hyperuricemia, vitamin D deficiency, obesity, GERD, and hypertension with white coat hypertension. 1. HTN:  Home BP Checks: yes. Taking: losartan. He used to take verapamil but this was d/c after he had dizzy spells and hypotension with his weight loss journey. His lowest BP is 130s/80s. 2. Anemia: He was given iron rx but stopped it months ago 2/2 constipation. Taking: mens vitamin pack from [facility]. No bleeding. No SOB. 3. Chronic Hip Pain: Present since his weight loss. He is jogging off/on during the week. Walking hurts. He has pain getting out of the car. He has sharp pain - along his left hip. +H/o playing football. Riding a bike has improved his hip pain. 4. Health Maintenance:  - He is not planning to get the flu shot. 5. Vitamin D Deficiency: He continues to take vitamin D prescription strength rx. Prior to Admission medications    Medication Sig Start Date End Date Taking? Authorizing Provider   ferrous sulfate (IRON) 325 mg (65 mg iron) EC tablet Take 1 Tab by mouth daily. 4/15/20   Carole Evans MD   ascorbic acid, vitamin C, (VITAMIN C) 250 mg tablet Take 1 Tab by mouth daily. 4/15/20   Carole Evans MD   docusate sodium (COLACE) 100 mg capsule Take 1 Cap by mouth two (2) times a day. 4/15/20   Carole Evans MD   promethazine (PHENERGAN) 25 mg tablet TAKE 1/2 TO 1 TABLET BY MOUTH EVERY 6 TO 8 HOURS AS NEEDED FOR NAUSEA 2/1/20   Provider, Historical   famotidine (PEPCID) 20 mg tablet TAKE 1 TABLET BY MOUTH TWICE DAILY 2/1/20   Provider, Historical   losartan (COZAAR) 50 mg tablet Take 0.5 Tabs by mouth daily. 2/11/20   Carole Evans MD   ergocalciferol (ERGOCALCIFEROL) 50,000 unit capsule Take 1 Cap by mouth every seven (7) days.  12/20/19   Juliette Orlnado MD     Allergies   Allergen Reactions    Lozol [Indapamide] Other (comments)     Severe cramping in stomach area    Amoxicillin Diarrhea     Past Medical History:   Diagnosis Date    GERD (gastroesophageal reflux disease)     uses OTC meds    Hypertension     no meds as of Jan 2018    [facility] [address] [facility]     Morbid obesity with body mass index of 40.0-49.9 ([facility] [address] [facility]      Past Surgical History:   Procedure Laterality Date    HX KNEE ARTHROSCOPY      HX ORTHOPAEDIC      ankle surgery / no hardware    HX TONSILLECTOMY       Family History   Problem Relation Age of Onset    Cancer Mother     Hypertension Mother     Diabetes Mother     Diabetes Father     Hypertension Father      Social History     Socioeconomic History    Marital status:      Spouse name: Not on file    Number of children: Not on file    Years of education: Not on file    Highest education level: Not on file   Tobacco Use    Smoking status: Never Smoker    Smokeless tobacco: Never Used   Substance and Sexual Activity    Alcohol use: Not Currently     Alcohol/week: 6.0 standard drinks     Types: 6 Shots of liquor per week    Drug use: No    Sexual activity: Yes     Partners: Female     Birth control/protection: None   Social History Narrative    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  No HA  CV: No CP  Resp: No cough/SOB  GI: No abdominal pain  : No hematuria/dysuria  Derm: No rash  Neuro: No new paresthesias/weakness  Musc: see HPI  Psych: No depression sx      Objective:     General: alert, cooperative, no distress   Mental  status: mental status: alert, oriented to person, place, and time, normal mood, behavior, speech, dress, motor activity, and thought processes   Resp: resp: normal effort and no respiratory distress   Neuro: neuro: no gross deficits   Skin: skin: no discoloration or lesions of concern on visible areas     LABS:  Lab Results   Component Value Date/Time    Sodium 146 (H) 04/08/2020 08:13 AM    Potassium 3.7 04/08/2020 08:13 AM    Chloride 104 04/08/2020 08:13 AM    CO2 23 04/08/2020 08:13 AM    Anion gap 9 06/26/2019 08:48 AM    Glucose 77 04/08/2020 08:13 AM    BUN 10 04/08/2020 08:13 AM    Creatinine 1.05 04/08/2020 08:13 AM    BUN/Creatinine ratio 10 04/08/2020 08:13 AM    GFR est  04/08/2020 08:13 AM    GFR est non-AA 87 04/08/2020 08:13 AM    Calcium 8.6 (L) 04/08/2020 08:13 AM       Lab Results   Component Value Date/Time    Cholesterol, total 151 04/08/2020 08:13 AM    HDL Cholesterol 47 04/08/2020 08:13 AM    LDL, calculated 85 04/08/2020 08:13 AM    VLDL, calculated 19 04/08/2020 08:13 AM    Triglyceride 97 04/08/2020 08:13 AM    CHOL/HDL Ratio 2.6 06/26/2019 08:48 AM       Lab Results   Component Value Date/Time    WBC 6.5 09/11/2020 09:07 AM    HGB 15.2 09/11/2020 09:07 AM    HCT 47.6 09/11/2020 09:07 AM    PLATELET 380 53/96/5153 09:07 AM    MCV 83.1 09/11/2020 09:07 AM       Lab Results   Component Value Date/Time    Hemoglobin A1c 5.1 04/08/2020 08:13 AM       Lab Results   Component Value Date/Time    TSH 0.55 09/11/2020 09:07 AM    TSH 0.73 01/13/2016 10:00 AM           Due to this being a TeleHealth  evaluation, many elements of the physical examination are unable to be assessed. The pt was seen by synchronous (real-time) audio-video technology, and/or her healthcare decision maker, is aware that this patient-initiated, Telehealth encounter is a billable service, with coverage as determined by her insurance carrier. She is aware that she may receive a bill and has provided verbal consent to proceed: Yes. The pt is being evaluated by a video visit encounter for concerns as above. A caregiver was present when appropriate. Due to this being a TeleHealth encounter (During [place]R-69 public health emergency), evaluation of the following organ systems was limited: Vitals/Constitutional/EENT/Resp/CV/GI//MS/Neuro/Skin/Heme-Lymph-Imm.    Pursuant to the emergency declaration under the [facility] waiver authority and the [place] [employer] Supplemental Appropriations Act, this Virtual  Visit was conducted, with patient's (and/or legal guardian's) consent, to reduce the patient's risk of exposure to COVID-19 and provide necessary medical care. Services were provided through a video synchronous discussion virtually to substitute for in-person clinic visit. Patient and provider were located at their individual homes. We discussed the expected course, resolution and complications of the diagnosis(es) in detail. Medication risks, benefits, costs, interactions, and alternatives were discussed as indicated. I advised him to contact the office if his condition worsens, changes or fails to improve as anticipated. He expressed understanding with the diagnosis(es) and plan.      Tucker Bledsoe MD

## 2021-08-03 LAB — SARS-COV-2, NAA: DETECTED

## 2021-09-15 ENCOUNTER — VIRTUAL VISIT (OUTPATIENT)
Dept: INTERNAL MEDICINE CLINIC | Age: 41
End: 2021-09-15
Payer: COMMERCIAL

## 2021-09-15 DIAGNOSIS — I10 ESSENTIAL HYPERTENSION: ICD-10-CM

## 2021-09-15 DIAGNOSIS — E66.01 MORBID OBESITY (HCC): ICD-10-CM

## 2021-09-15 DIAGNOSIS — M54.2 NECK PAIN: Primary | ICD-10-CM

## 2021-09-15 DIAGNOSIS — R73.02 IMPAIRED GLUCOSE TOLERANCE: ICD-10-CM

## 2021-09-15 DIAGNOSIS — K75.81 NASH (NONALCOHOLIC STEATOHEPATITIS): ICD-10-CM

## 2021-09-15 PROCEDURE — 99214 OFFICE O/P EST MOD 30 MIN: CPT | Performed by: INTERNAL MEDICINE

## 2021-09-15 RX ORDER — CYCLOBENZAPRINE HCL 5 MG
5 TABLET ORAL
Qty: 30 TABLET | Refills: 3 | Status: SHIPPED | OUTPATIENT
Start: 2021-09-15

## 2021-09-15 RX ORDER — LOSARTAN POTASSIUM 100 MG/1
100 TABLET ORAL DAILY
Qty: 30 TABLET | Refills: 3 | Status: SHIPPED | OUTPATIENT
Start: 2021-09-15 | End: 2021-10-04 | Stop reason: SINTOL

## 2021-09-15 NOTE — PROGRESS NOTES
Cecy Mercado is a 35 y.o. male who was seen by synchronous (real-time) audio-video technology on 9/15/2021. Assessment & Plan:   1. Essential hypertension: Borderline. +NAFLD. +Prediabetes. - Losartan ordered. - Checking labs    ORDERS:  - METABOLIC PANEL, COMPREHENSIVE; Future  - LIPID PANEL; Future  - losartan (COZAAR) 100 mg tablet; Take 1 Tablet by mouth daily. Dispense: 30 Tablet; Refill: 3  - MICROALBUMIN, UR, RAND W/ MICROALB/CREAT RATIO; Future    2. Neck Pain: From strain? No radicular sx. No weakness/paresthesias. - Flexeril trial.  - Warm compresses. - Stretching exercises  - Notify me if sx worsen or do not respond to therapies above. - Massage           Lab review: labs are reviewed in the EHR     I have discussed the diagnosis with the patient and the intended plan as seen in the above orders. I have discussed medication side effects and warnings with the patient as well. I have reviewed the plan of care with the patient, accepted their input and they are in agreement with the treatment goals. All questions were answered. The patient understands the plan of care. Pt instructed if symptoms worsen to call the office or report to the ED for continued care. Greater than 50% of the visit time was spent in counseling and/or coordination of care. Voice recognition was used to generate this report, which may have resulted in some phonetic based errors in grammar and contents. Even though attempts were made to correct all the mistakes, some may have been missed, and remained in the body of the document. Subjective:   Cecy Mercado was seen for   Chief Complaint   Patient presents with    Neck Pain     The patient is a 47-year-old male with history of CONDE, hyperuricemia, vitamin D deficiency, obesity, Covid-19 (2021), GERD, and hypertension with white coat hypertension. 1. HTN:  Home BP checks: yes. On losartan. BPs are 140s/90s. +H/o NAFLD. No excessive ETOH intake.  No tobacco use.    2. Neck Pain: Present x 1 month. \"The other day, I rolled over in the bed and it felt like someone stabbed me\" along his left neck. Pain is severe. He took old oxycodone (he had from a prevous surgery); it did not resolve his pain. Sx are slowly improving. No trauma. Pain does not radiate. No relief with cold and warm compresses. Advil helped him temporarily. 3. Health Maintenance:  - S/p 2 Moderna vaccinations. Prior to Admission medications    Medication Sig Start Date End Date Taking? Authorizing Provider   losartan (COZAAR) 50 mg tablet Take 1 Tab by mouth daily. 9/18/20   Aurelia Andersen MD   famotidine (PEPCID) 20 mg tablet TAKE 1 TABLET BY MOUTH TWICE DAILY 2/1/20   Provider, Historical   ergocalciferol (ERGOCALCIFEROL) 50,000 unit capsule Take 1 Cap by mouth every seven (7) days.  12/20/19   Nestor Roque MD     Allergies   Allergen Reactions    Lozol [Indapamide] Other (comments)     Severe cramping in stomach area    Amoxicillin Diarrhea     Past Medical History:   Diagnosis Date    GERD (gastroesophageal reflux disease)     uses OTC meds    Hypertension     no meds as of Jan 2018    [facility]bid [facility] ([facility] [address]     Morbid obesity with body mass index of 40.0-49.9 ([facility] [address] [facility]      Past Surgical History:   Procedure Laterality Date    HX KNEE ARTHROSCOPY      HX ORTHOPAEDIC      ankle surgery / no hardware    HX TONSILLECTOMY       Family History   Problem Relation Age of Onset    Cancer Mother     Hypertension Mother     Diabetes Mother     Diabetes Father     Hypertension Father      Social History     Socioeconomic History    Marital status:      Spouse name: Not on file    Number of children: Not on file    Years of education: Not on file    Highest education level: Not on file   Tobacco Use    Smoking status: Never Smoker    Smokeless tobacco: Never Used   Substance and Sexual Activity    Alcohol use: Not Currently     Alcohol/week: 6.0 standard drinks Types: 6 Shots of liquor per week    Drug use: No    Sexual activity: Yes     Partners: Female     Birth control/protection: None   Social History Narrative    Working currently--[employer]; Assisted living facility; Social Determinants of Health     Financial Resource Strain:     Difficulty of Paying Living Expenses:    Food Insecurity:     Worried About Running Out of Food in the Last Year:     [address] in the Last Year:    Transportation Needs:     Lack of Transportation (Medical):  Lack of Transportation (Non-Medical):    Physical Activity:     Days of Exercise per Week:     Minutes of Exercise per Session:    Stress:     Feeling of Stress :    Social Connections:     Frequency of Communication with Friends and Family:     Frequency of Social Gatherings with Friends and Family:     Attends Buddhism Services:     Active Member of Clubs or Organizations:     Attends Club or Organization Meetings:     Marital Status:        ROS:  Gen: No fever/chills  HEENT: No sore throat, eye pain, ear pain, or congestion. No HA  CV: No CP  Resp: No cough/SOB  GI: No abdominal pain  : No hematuria/dysuria  Derm: No rash  Neuro: see HPI  Musc: see HPI  Psych: No depression sx      Objective:     General: alert, cooperative, no distress   Mental  status: mental status: alert, oriented to person, place, and time, normal mood, behavior, speech, dress, motor activity, and thought processes   Resp: resp: normal effort and no respiratory distress   Neuro: neuro: no gross deficits   Skin: skin: no discoloration or lesions of concern on visible areas   He has adequate ROM along his c spine but he has pain with ROM exercises and ROM is somewhat limited by his pain.     LABS:  Lab Results   Component Value Date/Time    Sodium 146 (H) 04/08/2020 08:13 AM    Potassium 3.7 04/08/2020 08:13 AM    Chloride 104 04/08/2020 08:13 AM    CO2 23 04/08/2020 08:13 AM    Anion gap 9 06/26/2019 08:48 AM Glucose 77 04/08/2020 08:13 AM    BUN 10 04/08/2020 08:13 AM    Creatinine 1.05 04/08/2020 08:13 AM    BUN/Creatinine ratio 10 04/08/2020 08:13 AM    GFR est  04/08/2020 08:13 AM    GFR est non-AA 87 04/08/2020 08:13 AM    Calcium 8.6 (L) 04/08/2020 08:13 AM       Lab Results   Component Value Date/Time    Cholesterol, total 151 04/08/2020 08:13 AM    HDL Cholesterol 47 04/08/2020 08:13 AM    LDL, calculated 85 04/08/2020 08:13 AM    VLDL, calculated 19 04/08/2020 08:13 AM    Triglyceride 97 04/08/2020 08:13 AM    CHOL/HDL Ratio 2.6 06/26/2019 08:48 AM       Lab Results   Component Value Date/Time    WBC 6.5 09/11/2020 09:07 AM    HGB 15.2 09/11/2020 09:07 AM    HCT 47.6 09/11/2020 09:07 AM    PLATELET 354 76/62/0388 09:07 AM    MCV 83.1 09/11/2020 09:07 AM       Lab Results   Component Value Date/Time    Hemoglobin A1c 5.1 04/08/2020 08:13 AM       Lab Results   Component Value Date/Time    TSH 0.55 09/11/2020 09:07 AM    TSH 0.73 01/13/2016 10:00 AM           Due to this being a TeleHealth  evaluation, many elements of the physical examination are unable to be assessed. The pt was seen by synchronous (real-time) audio-video technology, and/or her healthcare decision maker, is aware that this patient-initiated, Telehealth encounter is a billable service, with coverage as determined by her insurance carrier. She is aware that she may receive a bill and has provided verbal consent to proceed: Yes. The pt is being evaluated by a video visit encounter for concerns as above. A caregiver was present when appropriate. Due to this being a TeleHealth encounter (During [place]-10 public health emergency), evaluation of the following organ systems was limited: Vitals/Constitutional/EENT/Resp/CV/GI//MS/Neuro/Skin/Heme-Lymph-Imm.    Pursuant to the emergency declaration under the [facility] waiver authority and the [employer] Appropriations Act, this Virtual  Visit was conducted, with patient's (and/or legal guardian's) consent, to reduce the patient's risk of exposure to COVID-19 and provide necessary medical care. Services were provided through a video synchronous discussion virtually to substitute for in-person clinic visit. Patient and provider were located at their individual homes. We discussed the expected course, resolution and complications of the diagnosis(es) in detail. Medication risks, benefits, costs, interactions, and alternatives were discussed as indicated. I advised him to contact the office if his condition worsens, changes or fails to improve as anticipated. He expressed understanding with the diagnosis(es) and plan.      Marcellus Hurtado MD

## 2021-09-28 ENCOUNTER — APPOINTMENT (OUTPATIENT)
Dept: INTERNAL MEDICINE CLINIC | Age: 41
End: 2021-09-28

## 2021-09-29 LAB
A-G RATIO,AGRAT: 1.9 RATIO (ref 1.1–2.6)
ABSOLUTE LYMPHOCYTE COUNT, 10803: 2 K/UL (ref 1–4.8)
ALBUMIN SERPL-MCNC: 4.5 G/DL (ref 3.5–5)
ALP SERPL-CCNC: 92 U/L (ref 25–115)
ALT SERPL-CCNC: 19 U/L (ref 5–40)
ANION GAP SERPL CALC-SCNC: 9 MMOL/L (ref 3–15)
AST SERPL W P-5'-P-CCNC: 19 U/L (ref 10–37)
AVG GLU, 10930: 99 MG/DL (ref 91–123)
BASOPHILS # BLD: 0 K/UL (ref 0–0.2)
BASOPHILS NFR BLD: 1 % (ref 0–2)
BILIRUB SERPL-MCNC: 0.4 MG/DL (ref 0.2–1.2)
BUN SERPL-MCNC: 20 MG/DL (ref 6–22)
CALCIUM SERPL-MCNC: 9.3 MG/DL (ref 8.4–10.5)
CHLORIDE SERPL-SCNC: 104 MMOL/L (ref 98–110)
CHOLEST SERPL-MCNC: 191 MG/DL (ref 110–200)
CO2 SERPL-SCNC: 30 MMOL/L (ref 20–32)
CREAT SERPL-MCNC: 1.1 MG/DL (ref 0.5–1.2)
CREATININE, URINE: 158 MG/DL
EOSINOPHIL # BLD: 0.1 K/UL (ref 0–0.5)
EOSINOPHIL NFR BLD: 1 % (ref 0–6)
ERYTHROCYTE [DISTWIDTH] IN BLOOD BY AUTOMATED COUNT: 14.6 % (ref 10–15.5)
GFRAA, 66117: >60
GFRNA, 66118: >60
GLOBULIN,GLOB: 2.4 G/DL (ref 2–4)
GLUCOSE SERPL-MCNC: 90 MG/DL (ref 70–99)
GRANULOCYTES,GRANS: 59 % (ref 40–75)
HBA1C MFR BLD HPLC: 5.1 % (ref 4.8–5.6)
HCT VFR BLD AUTO: 47.4 % (ref 36.6–51.9)
HDLC SERPL-MCNC: 2.5 MG/DL (ref 0–5)
HDLC SERPL-MCNC: 76 MG/DL
HGB BLD-MCNC: 14.5 G/DL (ref 13.2–17.3)
LDL/HDL RATIO,LDHD: 1.2
LDLC SERPL CALC-MCNC: 92 MG/DL (ref 50–99)
LYMPHOCYTES, LYMLT: 31 % (ref 20–45)
MCH RBC QN AUTO: 26 PG (ref 26–34)
MCHC RBC AUTO-ENTMCNC: 31 G/DL (ref 31–36)
MCV RBC AUTO: 86 FL (ref 80–95)
MICROALB/CREAT RATIO, 140286: 8.2 (ref 0–30)
MICROALBUMIN,URINE RANDOM 140054: 13 MG/L (ref 0.1–17)
MONOCYTES # BLD: 0.5 K/UL (ref 0.1–1)
MONOCYTES NFR BLD: 8 % (ref 3–12)
NEUTROPHILS # BLD AUTO: 3.8 K/UL (ref 1.8–7.7)
NON-HDL CHOLESTEROL, 011976: 115 MG/DL
PLATELET # BLD AUTO: 275 K/UL (ref 140–440)
PMV BLD AUTO: 10.7 FL (ref 9–13)
POTASSIUM SERPL-SCNC: 4.3 MMOL/L (ref 3.5–5.5)
PROT SERPL-MCNC: 6.9 G/DL (ref 6.4–8.3)
RBC # BLD AUTO: 5.54 M/UL (ref 3.8–5.8)
SODIUM SERPL-SCNC: 143 MMOL/L (ref 133–145)
TRIGL SERPL-MCNC: 116 MG/DL (ref 40–149)
VLDLC SERPL CALC-MCNC: 23 MG/DL (ref 8–30)
WBC # BLD AUTO: 6.4 K/UL (ref 4–11)

## 2021-09-30 NOTE — PROGRESS NOTES
I will discuss his results with him at his upcoming appointment.   His A1c, CMP, lipid panel, CBC, and urine protein screen are all normal.    Dr. Hui Boards  [facility] [address]  Phone: [phone]  Fax: [phone]

## 2021-10-01 NOTE — PROGRESS NOTES
Otilia Gonzalez presents today for   Chief Complaint   Patient presents with   [address] [place] [address] Follow-up    Labs     9-28-21            Coordination of Care:  1. Have you been to the ER, urgent care clinic since your last visit? Hospitalized since your last visit? no    2. Have you seen or consulted any other health care providers outside of the [address] [place] [address] since your last visit? Include any pap smears or colon screening.  yes

## 2021-10-04 ENCOUNTER — OFFICE VISIT (OUTPATIENT)
Dept: INTERNAL MEDICINE CLINIC | Age: 41
End: 2021-10-04
Payer: COMMERCIAL

## 2021-10-04 VITALS
BODY MASS INDEX: 37.51 KG/M2 | HEIGHT: 76 IN | DIASTOLIC BLOOD PRESSURE: 110 MMHG | OXYGEN SATURATION: 96 % | SYSTOLIC BLOOD PRESSURE: 157 MMHG | HEART RATE: 87 BPM | WEIGHT: 308 LBS | RESPIRATION RATE: 16 BRPM | TEMPERATURE: 98.1 F

## 2021-10-04 DIAGNOSIS — I10 PRIMARY HYPERTENSION: Primary | ICD-10-CM

## 2021-10-04 DIAGNOSIS — Z86.2 HISTORY OF ANEMIA: ICD-10-CM

## 2021-10-04 DIAGNOSIS — M54.2 NECK PAIN: ICD-10-CM

## 2021-10-04 PROCEDURE — 99214 OFFICE O/P EST MOD 30 MIN: CPT | Performed by: INTERNAL MEDICINE

## 2021-10-04 RX ORDER — HYDROCHLOROTHIAZIDE 12.5 MG/1
12.5 TABLET ORAL DAILY
Qty: 90 TABLET | Refills: 3 | Status: SHIPPED | OUTPATIENT
Start: 2021-10-04 | End: 2021-11-17 | Stop reason: SDUPTHER

## 2021-10-04 NOTE — PATIENT INSTRUCTIONS
Body Mass Index: Care Instructions  Your Care Instructions     Body mass index (BMI) can help you see if your weight is raising your risk for health problems. It uses a formula to compare how much you weigh with how tall you are. · A BMI lower than 18.5 is considered underweight. · A BMI between 18.5 and 24.9 is considered healthy. · A BMI between 25 and 29.9 is considered overweight. A BMI of 30 or higher is considered obese. If your BMI is in the normal range, it means that you have a lower risk for weight-related health problems. If your BMI is in the overweight or obese range, you may be at increased risk for weight-related health problems, such as high blood pressure, heart disease, stroke, arthritis or joint pain, and diabetes. If your BMI is in the underweight range, you may be at increased risk for health problems such as fatigue, lower protection (immunity) against illness, muscle loss, bone loss, hair loss, and hormone problems. BMI is just one measure of your risk for weight-related health problems. You may be at higher risk for health problems if you are not active, you eat an unhealthy diet, or you drink too much alcohol or use tobacco products. Follow-up care is a key part of your treatment and safety. Be sure to make and go to all appointments, and call your doctor if you are having problems. It's also a good idea to know your test results and keep a list of the medicines you take. How can you care for yourself at home? · Practice healthy eating habits. This includes eating plenty of fruits, vegetables, whole grains, lean protein, and low-fat dairy. · If your doctor recommends it, get more exercise. Walking is a good choice. Bit by bit, increase the amount you walk every day. Try for at least 30 minutes on most days of the week. · Do not smoke. Smoking can increase your risk for health problems. If you need help quitting, talk to your doctor about stop-smoking programs and medicines. These can increase your chances of quitting for good. · Limit alcohol to 2 drinks a day for men and 1 drink a day for women. Too much alcohol can cause health problems. If you have a BMI higher than 25  · Your doctor may do other tests to check your risk for weight-related health problems. This may include measuring the distance around your waist. A waist measurement of more than 40 inches in men or 35 inches in women can increase the risk of weight-related health problems. · Talk with your doctor about steps you can take to stay healthy or improve your health. You may need to make lifestyle changes to lose weight and stay healthy, such as changing your diet and getting regular exercise. If you have a BMI lower than 18.5  · Your doctor may do other tests to check your risk for health problems. · Talk with your doctor about steps you can take to stay healthy or improve your health. You may need to make lifestyle changes to gain or maintain weight and stay healthy, such as getting more healthy foods in your diet and doing exercises to build muscle. Where can you learn more? Go to http://www.esteves.com/  Enter S176 in the search box to learn more about \"Body Mass Index: Care Instructions. \"  Current as of: March 17, 2021               Content Version: 13.0  © 2006-2021 [employer]. Care instructions adapted under license by [employer] (which disclaims liability or warranty for this information). If you have questions about a medical condition or this instruction, always ask your healthcare professional. [place] [address] any warranty or liability for your use of this information.

## 2021-10-04 NOTE — PROGRESS NOTES
INTERNISTS [facility]:  10/4/2021, MRN: [id number]      Darcy Milian is a 35 y.o. male and presents to clinic for  Follow-up and Labs (9-28-21)      Subjective: The patient is a 78-year-old male with history of CONDE, hyperuricemia, vitamin D deficiency, obesity, GERD, and hypertension with white coat hypertension. 1.  Hypertension:  He has a history of intolerance to verapamil (which caused hypotension and dizziness). On losartan. His most recent labs show normal LFTs, a normal lipid panel, and unremarkable A1c. There is no microalbuminuria. He has not taken his rx in 2 days due to associating his rx with recent depression sx he noticed with taking this rx. His wife also noticed a change in his mood. He took losartan in the past but at a smaller dose - w/o side effects. He is exercising via an exercise bike. His BP is high off of his rx (157/110). It was WNL when he was on 100mg of losartan. 2.  Anemia History: He had constipation when taking iron in the past.  His most recent labs do not show any anemia. 3. Neck Pain: Treated with flexeril. This rx caused him to feel drowsy. Eventually, his neck pain sx improved dramatically. No weakness. No paresthesias. Patient Active Problem List    Diagnosis Date Noted    Impaired glucose tolerance 07/03/2019    CONDE (nonalcoholic steatohepatitis) 08/31/2018    Hyperuricemia 08/31/2018    Vitamin D deficiency 08/31/2018    Morbid obesity ([address]     GERD (gastroesophageal reflux disease)     Hypertension 12/01/2015       Current Outpatient Medications   Medication Sig Dispense Refill    cyclobenzaprine (FLEXERIL) 5 mg tablet Take 1 Tablet by mouth three (3) times daily as needed for Muscle Spasm(s). 30 Tablet 3    losartan (COZAAR) 100 mg tablet Take 1 Tablet by mouth daily.  30 Tablet 3    famotidine (PEPCID) 20 mg tablet TAKE 1 TABLET BY MOUTH TWICE DAILY         Allergies   Allergen Reactions    Lozol [Indapamide] Other (comments)     Severe cramping in stomach area    Amoxicillin Diarrhea       Past Medical History:   Diagnosis Date    GERD (gastroesophageal reflux disease)     uses OTC meds    Hypertension     no meds as of Jan 2018    Morbid [address] [address]     Morbid obesity with body mass index of 40.0-49.9 ([facility] [address] [facility]        Past Surgical History:   Procedure Laterality Date    HX KNEE ARTHROSCOPY      HX ORTHOPAEDIC      ankle surgery / no hardware    HX TONSILLECTOMY         Family History   Problem Relation Age of Onset    Cancer Mother     Hypertension Mother     Diabetes Mother     Diabetes Father     Hypertension Father        Social History     Tobacco Use    Smoking status: Never Smoker    Smokeless tobacco: Never Used   Substance Use Topics    Alcohol use: Not Currently     Alcohol/week: 6.0 standard drinks     Types: 6 Shots of liquor per week       ROS   Review of Systems   Constitutional: Negative for chills and fever. HENT: Negative for ear pain and sore throat. Eyes: Negative for blurred vision and pain. Respiratory: Negative for cough and shortness of breath. Cardiovascular: Negative for chest pain. Gastrointestinal: Negative for abdominal pain, blood in stool and melena. Genitourinary: Negative for dysuria, hematuria and urgency. Musculoskeletal: Positive for joint pain. Negative for myalgias. Skin: Negative for rash. Neurological: Negative for tingling, focal weakness and headaches. Endo/Heme/Allergies: Does not bruise/bleed easily. Psychiatric/Behavioral: Negative for substance abuse. Objective     Vitals:    10/04/21 1345 10/04/21 1350   BP: (!) 171/113 (!) 157/110   Pulse: 87    Resp: 16    Temp: 98.1 °F (36.7 °C)    TempSrc: Temporal    SpO2: 96%    Weight: 308 lb (139.7 kg)    Height: 6' 4\" (1.93 m)    PainSc:   0 - No pain        Physical Exam  Vitals and nursing note reviewed. Constitutional:       Appearance: Normal appearance. HENT:      Head: Normocephalic and atraumatic. Right Ear: External ear normal.      Left Ear: External ear normal.   Eyes:      Extraocular Movements: Extraocular movements intact. Conjunctiva/sclera: Conjunctivae normal.   Cardiovascular:      Rate and Rhythm: Normal rate and regular rhythm. Pulses: Normal pulses. Heart sounds: Normal heart sounds. Pulmonary:      Effort: Pulmonary effort is normal.      Breath sounds: Normal breath sounds. Abdominal:      General: Abdomen is flat. Bowel sounds are normal.      Palpations: Abdomen is soft. Tenderness: There is no abdominal tenderness. Musculoskeletal:         General: No swelling or tenderness. Cervical back: Neck supple. Lymphadenopathy:      Cervical: No cervical adenopathy. Skin:     General: Skin is warm and dry. Findings: No erythema. Neurological:      Mental Status: He is alert. Mental status is at baseline.       Gait: Gait normal.   Psychiatric:         Mood and Affect: Mood normal.         LABS   Data Review:   Lab Results   Component Value Date/Time    WBC 6.4 09/28/2021 02:36 PM    HGB 14.5 09/28/2021 02:36 PM    HCT 47.4 09/28/2021 02:36 PM    PLATELET 049 01/40/8985 02:36 PM    MCV 86 09/28/2021 02:36 PM       Lab Results   Component Value Date/Time    Sodium 143 09/28/2021 02:36 PM    Potassium 4.3 09/28/2021 02:36 PM    Chloride 104 09/28/2021 02:36 PM    CO2 30 09/28/2021 02:36 PM    Anion gap 9.0 09/28/2021 02:36 PM    Glucose 90 09/28/2021 02:36 PM    BUN 20 09/28/2021 02:36 PM    Creatinine 1.1 09/28/2021 02:36 PM    BUN/Creatinine ratio 10 04/08/2020 08:13 AM    GFR est  04/08/2020 08:13 AM    GFR est non-AA 87 04/08/2020 08:13 AM    Calcium 9.3 09/28/2021 02:36 PM       Lab Results   Component Value Date/Time    Cholesterol, total 191 09/28/2021 02:36 PM    HDL Cholesterol 76 09/28/2021 02:36 PM    LDL, calculated 92 09/28/2021 02:36 PM    VLDL, calculated 23 09/28/2021 02:36 PM    Triglyceride 116 09/28/2021 02:36 PM    CHOL/HDL Ratio 2.6 06/26/2019 08:48 AM       Lab Results   Component Value Date/Time    Hemoglobin A1c 5.1 09/28/2021 02:36 PM       Assessment/Plan:   1. Hypertension: Stable. D/c losartan. Starting HCTZ. Checking a f/u BMP to reassess his renal function and electrolytes  - F/u with me in 4 wks to reassess his BP  - Losartan added as a medication intolerance. 2.  Anemia history: Resolved. Observation. 3. Neck Pain: Resolved. Observation. **I encouraged him to get vaccinated against COVID-19**    Health Maintenance Due   Topic Date Due    Hepatitis C Screening  Never done    COVID-19 Vaccine (1) Never done     Lab review: labs are reviewed in the EHR and ordered as mentioned above. I have discussed the diagnosis with the patient and the intended plan as seen in the above orders. The patient has received an after-visit summary and questions were answered concerning future plans. I have discussed medication side effects and warnings with the patient as well. I have reviewed the plan of care with the patient, accepted their input and they are in agreement with the treatment goals. All questions were answered. The patient understands the plan of care. Handouts provided today with above information. Pt instructed if symptoms worsen to call the office or report to the ED for continued care. Greater than 50% of the visit time was spent in counseling and/or coordination of care. Voice recognition was used to generate this report, which may have resulted in some phonetic based errors in grammar and contents. Even though attempts were made to correct all the mistakes, some may have been missed, and remained in the body of the document.           Hans Salinas MD

## 2021-10-11 DIAGNOSIS — I10 PRIMARY HYPERTENSION: ICD-10-CM

## 2021-11-10 ENCOUNTER — APPOINTMENT (OUTPATIENT)
Dept: INTERNAL MEDICINE CLINIC | Age: 41
End: 2021-11-10

## 2021-11-11 LAB
ANION GAP SERPL CALC-SCNC: 13 MMOL/L (ref 3–15)
BUN SERPL-MCNC: 15 MG/DL (ref 6–22)
CALCIUM SERPL-MCNC: 9.2 MG/DL (ref 8.4–10.5)
CHLORIDE SERPL-SCNC: 102 MMOL/L (ref 98–110)
CO2 SERPL-SCNC: 29 MMOL/L (ref 20–32)
CREAT SERPL-MCNC: 1 MG/DL (ref 0.5–1.2)
GFRAA, 66117: >60
GFRNA, 66118: >60
GLUCOSE SERPL-MCNC: 72 MG/DL (ref 70–99)
POTASSIUM SERPL-SCNC: 3.9 MMOL/L (ref 3.5–5.5)
SODIUM SERPL-SCNC: 144 MMOL/L (ref 133–145)

## 2021-11-15 NOTE — PROGRESS NOTES
I will discuss his unremarkable BMP with him at his upcoming apt.      Dr. Akhil Chavis  [facility], [address] [facility], [address]  Phone: [phone]  Fax: [phone]

## 2021-11-17 ENCOUNTER — OFFICE VISIT (OUTPATIENT)
Dept: INTERNAL MEDICINE CLINIC | Age: 41
End: 2021-11-17
Payer: COMMERCIAL

## 2021-11-17 VITALS
DIASTOLIC BLOOD PRESSURE: 95 MMHG | OXYGEN SATURATION: 98 % | HEIGHT: 76 IN | RESPIRATION RATE: 14 BRPM | BODY MASS INDEX: 37.14 KG/M2 | TEMPERATURE: 97.8 F | HEART RATE: 90 BPM | WEIGHT: 305 LBS | SYSTOLIC BLOOD PRESSURE: 140 MMHG

## 2021-11-17 DIAGNOSIS — I10 PRIMARY HYPERTENSION: Primary | ICD-10-CM

## 2021-11-17 DIAGNOSIS — R73.9 HYPERGLYCEMIA: ICD-10-CM

## 2021-11-17 PROCEDURE — 99214 OFFICE O/P EST MOD 30 MIN: CPT | Performed by: INTERNAL MEDICINE

## 2021-11-17 RX ORDER — HYDROCHLOROTHIAZIDE 25 MG/1
25 TABLET ORAL DAILY
Qty: 90 TABLET | Refills: 1 | Status: SHIPPED | OUTPATIENT
Start: 2021-11-17 | End: 2022-05-29

## 2021-11-17 NOTE — PROGRESS NOTES
Zahraa Awad presents today for   Chief Complaint   Patient presents with    Follow-up                1. \"Have you been to the ER, urgent care clinic since your last visit? Hospitalized since your last visit? \" {no    2. \"Have you seen or consulted any other health care providers outside of the [address] [place] [address] since your last visit? \" yes

## 2021-11-17 NOTE — PATIENT INSTRUCTIONS

## 2021-11-17 NOTE — PROGRESS NOTES
INTERNISTS OF [facility]:  11/24/2021, MRN: [id number]      Mer Israel is a 35 y.o. male and presents to clinic for Follow-up      Subjective: The patient is a 47-year-old male with history of CONDE, hyperuricemia, vitamin D deficiency, obesity, prediabetes, GERD, and hypertension with white coat hypertension. HTN: +H/o intolerance to verapamil (caused hypotension/dizziness) and losartan (caused depression sx). He was placed on HCTZ for better BP control at his last apt. A f/u BMP was checked and WNL. His BP is running 140s/90s at home. No adverse side effects from HCTZ. No depression. Weight is 305lbs. +H/o prediabetes. His last A1c was within normal limits.         Patient Active Problem List    Diagnosis Date Noted    Impaired glucose tolerance 07/03/2019    CONDE (nonalcoholic steatohepatitis) 08/31/2018    Hyperuricemia 08/31/2018    Vitamin D deficiency 08/31/2018    Morbid [facility] ([facility]     GERD (gastroesophageal reflux disease)     Hypertension 12/01/2015       Current Outpatient Medications   Medication Sig Dispense Refill    hydroCHLOROthiazide (HYDRODIURIL) 25 mg tablet Take 1 Tablet by mouth daily. 90 Tablet 1    famotidine (PEPCID) 20 mg tablet TAKE 1 TABLET BY MOUTH TWICE DAILY      cyclobenzaprine (FLEXERIL) 5 mg tablet Take 1 Tablet by mouth three (3) times daily as needed for Muscle Spasm(s).  (Patient not taking: Reported on 11/17/2021) 30 Tablet 3       Allergies   Allergen Reactions    Lozol [Indapamide] Other (comments)     Severe cramping in stomach area    Amoxicillin Diarrhea    Losartan Other (comments)     Depression       Past Medical History:   Diagnosis Date    GERD (gastroesophageal reflux disease)     uses OTC meds    Hypertension     no meds as of Jan 2018    Morbid obesity ([address]     Morbid obesity with body mass index of 40.0-49.9 ([address]        Past Surgical History:   Procedure Laterality Date    HX KNEE ARTHROSCOPY      HX ORTHOPAEDIC      ankle surgery / no hardware    HX TONSILLECTOMY         Family History   Problem Relation Age of Onset   [facility] Cancer Mother     Hypertension Mother     Diabetes Mother     Diabetes Father     Hypertension Father        Social History     Tobacco Use    Smoking status: Never Smoker    Smokeless tobacco: Never Used   Substance Use Topics    Alcohol use: Not Currently     Alcohol/week: 6.0 standard drinks     Types: 6 Shots of liquor per week       ROS   Review of Systems   Constitutional: Negative for chills and fever. HENT: Negative for ear pain and sore throat. Eyes: Negative for blurred vision and pain. Respiratory: Negative for cough and shortness of breath. Cardiovascular: Negative for chest pain. Gastrointestinal: Negative for abdominal pain, blood in stool and melena. Genitourinary: Negative for dysuria and hematuria. Musculoskeletal: Positive for joint pain (off/on). Negative for myalgias. Skin: Negative for rash. Neurological: Negative for headaches. Endo/Heme/Allergies: Does not bruise/bleed easily. Psychiatric/Behavioral: Negative for substance abuse. Objective     Vitals:    11/17/21 1352 11/17/21 1355   BP: (!) 140/98 (!) 140/95   Pulse: 90    Resp: 14    Temp: 97.8 °F (36.6 °C)    TempSrc: Temporal    SpO2: 98%    Weight: 305 lb (138.3 kg)    Height: 6' 4\" (1.93 m)    PainSc:   0 - No pain        Physical Exam  Vitals and nursing note reviewed. Constitutional:       Appearance: Normal appearance. HENT:      Head: Normocephalic and atraumatic. Eyes:      Conjunctiva/sclera: Conjunctivae normal.   Cardiovascular:      Rate and Rhythm: Normal rate and regular rhythm. Pulses: Normal pulses. Heart sounds: Normal heart sounds. Pulmonary:      Effort: Pulmonary effort is normal.      Breath sounds: Normal breath sounds. Abdominal:      General: Abdomen is flat. Bowel sounds are normal. There is no distension. Palpations: Abdomen is soft. Tenderness:  There is no abdominal tenderness. Musculoskeletal:         General: No swelling or tenderness (BUE). Cervical back: Neck supple. Lymphadenopathy:      Cervical: No cervical adenopathy. Skin:     General: Skin is warm and dry. Findings: No erythema. Neurological:      General: No focal deficit present. Mental Status: He is alert. Gait: Gait normal.   Psychiatric:         Mood and Affect: Mood normal.         LABS   Data Review:   Lab Results   Component Value Date/Time    WBC 6.4 09/28/2021 02:36 PM    HGB 14.5 09/28/2021 02:36 PM    HCT 47.4 09/28/2021 02:36 PM    PLATELET 172 55/61/6383 02:36 PM    MCV 86 09/28/2021 02:36 PM       Lab Results   Component Value Date/Time    Sodium 144 11/10/2021 11:34 AM    Potassium 3.9 11/10/2021 11:34 AM    Chloride 102 11/10/2021 11:34 AM    CO2 29 11/10/2021 11:34 AM    Anion gap 13.0 11/10/2021 11:34 AM    Glucose 72 11/10/2021 11:34 AM    BUN 15 11/10/2021 11:34 AM    Creatinine 1.0 11/10/2021 11:34 AM    BUN/Creatinine ratio 10 04/08/2020 08:13 AM    GFR est  04/08/2020 08:13 AM    GFR est non-AA 87 04/08/2020 08:13 AM    Calcium 9.2 11/10/2021 11:34 AM       Lab Results   Component Value Date/Time    Cholesterol, total 191 09/28/2021 02:36 PM    HDL Cholesterol 76 09/28/2021 02:36 PM    LDL, calculated 92 09/28/2021 02:36 PM    VLDL, calculated 23 09/28/2021 02:36 PM    Triglyceride 116 09/28/2021 02:36 PM    CHOL/HDL Ratio 2.6 06/26/2019 08:48 AM       Lab Results   Component Value Date/Time    Hemoglobin A1c 5.1 09/28/2021 02:36 PM       Assessment/Plan:   Primary hypertension  Refilling his HCTZ but increasing it to 25 mg daily versus 12.5 mg daily as his blood pressure is still not quite at goal.  Return to clinic for a BP check. Checking a BMP to assess his electrolytes on the higher HCTZ dose. I will check labs again in 6 months. I encouraged him to maintain a heart healthy diet and to reduce his weight.     ORDERS:  - hydroCHLOROthiazide (HYDRODIURIL) 25 mg tablet; Take 1 Tablet by mouth daily. Dispense: 90 Tablet; Refill: 1  - METABOLIC PANEL, BASIC; Future        Health Maintenance Due   Topic Date Due    Hepatitis C Screening  Never done    COVID-19 Vaccine (1) Never done     Lab review: labs are reviewed in the EHR and ordered as mentioned above. I have discussed the diagnosis with the patient and the intended plan as seen in the above orders. The patient has received an after-visit summary and questions were answered concerning future plans. I have discussed medication side effects and warnings with the patient as well. I have reviewed the plan of care with the patient, accepted their input and they are in agreement with the treatment goals. All questions were answered. The patient understands the plan of care. Handouts provided today with above information. Pt instructed if symptoms worsen to call the office or report to the ED for continued care. Greater than 50% of the visit time was spent in counseling and/or coordination of care. Voice recognition was used to generate this report, which may have resulted in some phonetic based errors in grammar and contents. Even though attempts were made to correct all the mistakes, some may have been missed, and remained in the body of the document.           Vance Blanchard MD

## 2021-12-15 ENCOUNTER — APPOINTMENT (OUTPATIENT)
Dept: INTERNAL MEDICINE CLINIC | Age: 41
End: 2021-12-15

## 2021-12-16 LAB
ANION GAP SERPL CALC-SCNC: 12 MMOL/L (ref 3–15)
BUN SERPL-MCNC: 16 MG/DL (ref 6–22)
CALCIUM SERPL-MCNC: 9.5 MG/DL (ref 8.4–10.5)
CHLORIDE SERPL-SCNC: 101 MMOL/L (ref 98–110)
CO2 SERPL-SCNC: 30 MMOL/L (ref 20–32)
CREAT SERPL-MCNC: 1.2 MG/DL (ref 0.5–1.2)
GFRAA, 66117: >60
GFRNA, 66118: >60
GLUCOSE SERPL-MCNC: 94 MG/DL (ref 70–99)
POTASSIUM SERPL-SCNC: 4.3 MMOL/L (ref 3.5–5.5)
SODIUM SERPL-SCNC: 143 MMOL/L (ref 133–145)

## 2021-12-17 NOTE — PROGRESS NOTES
Please let him know that his kidney function labs and electrolytes are normal.  He should continue taking his medication as prescribed.     Dr. Guerra Friday  [facility], [address] [facility], [address] [place] [address] Str[address]  Phone: [phone]  Fax: [phone]

## 2021-12-20 ENCOUNTER — TELEPHONE (OUTPATIENT)
Dept: INTERNAL MEDICINE CLINIC | Age: 41
End: 2021-12-20

## 2021-12-20 NOTE — TELEPHONE ENCOUNTER
----- Message from Tereasa Angelucci, MD sent at 12/17/2021  1:09 PM EST -----  Please let him know that his kidney function labs and electrolytes are normal.  He should continue taking his medication as prescribed.     Dr. Fortino Butts  Internists of [address], [address] [place] [address] Str[address]  Phone: [phone]  Fax: [phone]

## 2022-01-16 DIAGNOSIS — I10 PRIMARY HYPERTENSION: ICD-10-CM

## 2022-01-16 DIAGNOSIS — R73.9 HYPERGLYCEMIA: ICD-10-CM

## 2022-03-18 PROBLEM — K75.81 NASH (NONALCOHOLIC STEATOHEPATITIS): Status: ACTIVE | Noted: 2018-08-31

## 2022-03-19 PROBLEM — E79.0 HYPERURICEMIA: Status: ACTIVE | Noted: 2018-08-31

## 2022-03-19 PROBLEM — R73.02 IMPAIRED GLUCOSE TOLERANCE: Status: ACTIVE | Noted: 2019-07-03

## 2022-03-19 PROBLEM — E55.9 VITAMIN D DEFICIENCY: Status: ACTIVE | Noted: 2018-08-31

## 2022-05-18 ENCOUNTER — APPOINTMENT (OUTPATIENT)
Dept: INTERNAL MEDICINE CLINIC | Age: 42
End: 2022-05-18

## 2022-05-19 LAB
A-G RATIO,AGRAT: 2.4 RATIO (ref 1.1–2.6)
ALBUMIN SERPL-MCNC: 4.8 G/DL (ref 3.5–5)
ALP SERPL-CCNC: 73 U/L (ref 25–115)
ALT SERPL-CCNC: 26 U/L (ref 5–40)
ANION GAP SERPL CALC-SCNC: 14 MMOL/L (ref 3–15)
AST SERPL W P-5'-P-CCNC: 20 U/L (ref 10–37)
AVG GLU, 10930: 104 MG/DL (ref 91–123)
BILIRUB SERPL-MCNC: 0.5 MG/DL (ref 0.2–1.2)
BUN SERPL-MCNC: 18 MG/DL (ref 6–22)
CALCIUM SERPL-MCNC: 9.9 MG/DL (ref 8.4–10.5)
CHLORIDE SERPL-SCNC: 99 MMOL/L (ref 98–110)
CHOLEST SERPL-MCNC: 212 MG/DL (ref 110–200)
CO2 SERPL-SCNC: 30 MMOL/L (ref 20–32)
CREAT SERPL-MCNC: 1.2 MG/DL (ref 0.5–1.2)
CREATININE, URINE: 205 MG/DL
GFRAA, 66117: >60
GFRNA, 66118: >60
GLOBULIN,GLOB: 2 G/DL (ref 2–4)
GLUCOSE SERPL-MCNC: 88 MG/DL (ref 70–99)
HBA1C MFR BLD HPLC: 5.3 % (ref 4.8–5.6)
HDLC SERPL-MCNC: 2.7 MG/DL (ref 0–5)
HDLC SERPL-MCNC: 78 MG/DL
LDL/HDL RATIO,LDHD: 1.5
LDLC SERPL CALC-MCNC: 116 MG/DL (ref 50–99)
MICROALB/CREAT RATIO, 140286: NORMAL
MICROALBUMIN,URINE RANDOM 140054: <12 MG/L (ref 0.1–17)
NON-HDL CHOLESTEROL, 011976: 134 MG/DL
POTASSIUM SERPL-SCNC: 3.9 MMOL/L (ref 3.5–5.5)
PROT SERPL-MCNC: 6.8 G/DL (ref 6.4–8.3)
SODIUM SERPL-SCNC: 143 MMOL/L (ref 133–145)
TRIGL SERPL-MCNC: 91 MG/DL (ref 40–149)
VLDLC SERPL CALC-MCNC: 18 MG/DL (ref 8–30)

## 2022-05-24 DIAGNOSIS — I10 PRIMARY HYPERTENSION: ICD-10-CM

## 2022-05-24 DIAGNOSIS — R73.9 HYPERGLYCEMIA: ICD-10-CM

## 2022-05-25 ENCOUNTER — OFFICE VISIT (OUTPATIENT)
Dept: INTERNAL MEDICINE CLINIC | Age: 42
End: 2022-05-25
Payer: COMMERCIAL

## 2022-05-25 VITALS
DIASTOLIC BLOOD PRESSURE: 100 MMHG | TEMPERATURE: 97.4 F | WEIGHT: 315 LBS | BODY MASS INDEX: 38.36 KG/M2 | RESPIRATION RATE: 16 BRPM | SYSTOLIC BLOOD PRESSURE: 146 MMHG | HEART RATE: 91 BPM | OXYGEN SATURATION: 98 % | HEIGHT: 76 IN

## 2022-05-25 DIAGNOSIS — E78.5 HYPERLIPIDEMIA, UNSPECIFIED HYPERLIPIDEMIA TYPE: ICD-10-CM

## 2022-05-25 DIAGNOSIS — M79.674 GREAT TOE PAIN, RIGHT: ICD-10-CM

## 2022-05-25 DIAGNOSIS — R73.02 IMPAIRED GLUCOSE TOLERANCE: ICD-10-CM

## 2022-05-25 DIAGNOSIS — I10 PRIMARY HYPERTENSION: Primary | ICD-10-CM

## 2022-05-25 PROCEDURE — 99214 OFFICE O/P EST MOD 30 MIN: CPT | Performed by: INTERNAL MEDICINE

## 2022-05-25 RX ORDER — AMLODIPINE BESYLATE 5 MG/1
5 TABLET ORAL DAILY
Qty: 90 TABLET | Refills: 3 | Status: SHIPPED | OUTPATIENT
Start: 2022-05-25

## 2022-05-25 RX ORDER — PREDNISONE 20 MG/1
40 TABLET ORAL
Qty: 10 TABLET | Refills: 0 | Status: SHIPPED | OUTPATIENT
Start: 2022-05-25

## 2022-05-25 NOTE — PROGRESS NOTES
Lori Henry presents today for   Chief Complaint   Patient presents with   Anne-Marie Pro Follow-up    Labs     5-18-22    Toe Pain     Right foot big Toe pain x 1 month. Patient reports some discoloration, but denies any swelling or injury         1. \"Have you been to the ER, urgent care clinic since your last visit? Hospitalized since your last visit? \" no    2. \"Have you seen or consulted any other health care providers outside of the [address] [place] [address] since your last visit? \" yes     3. For patients aged 39-70: Has the patient had a colonoscopy / FIT/ Cologuard? NA - based on age      If the patient is female:    4. For patients aged 41-77: Has the patient had a mammogram within the past 2 years? NA - based on age or sex  See top three    5. For patients aged 21-65: Has the patient had a pap smear?  NA - based on age or sex

## 2022-05-25 NOTE — PROGRESS NOTES
INTERNISTS OF [facility]:  5/25/2022, MRN: [id number]      Kerri Mcnulty is a 38 y.o. male and presents to clinic for Follow-up, Labs (5-18-22), and Toe Pain (Right foot big Toe pain x 1 month. Patient reports some discoloration, but denies any swelling or injury)      Subjective: The patient is a 25-year-old male with history of CONDE, hyperuricemia, vitamin D deficiency, obesity, prediabetes, GERD, and hypertension with white coat hypertension. 1. HTN: Home BPs are in the 140s/90s since his last apt. +HCTZ. He is not exercising a lot but restarted biking on a [employer] bike and exercising 2 wks ago. +Gained weight. His weight is 325 pounds, up from 305 pounds in November. He was unable to tolerate verapamil secondary to hypotension/dizziness. He was also unable to tolerate losartan, due to to associated depression symptoms. 2. Prediabetes: Resolved per recent labs. 3. HLD: His LDL Is 116. He is eating more seafood/shellfish (crab and shrimp). He stopped taking fish oil pills in between apts but 2 wks ago, he restarted taking it. He is not on a cholesterol-lowering medication. 4. Right Big Toe Pain: Present x 1 month. No h/o trauma. \"I think it's turf toe. \" +Eats shellfish. Pain comes and goes. No paresthesias. Patient Active Problem List    Diagnosis Date Noted    Impaired glucose tolerance 07/03/2019    CONDE (nonalcoholic steatohepatitis) 08/31/2018    Hyperuricemia 08/31/2018    Vitamin D deficiency 08/31/2018    Morbid obesity ([facility] [address]     GERD (gastroesophageal reflux disease)     Hypertension 12/01/2015       Current Outpatient Medications   Medication Sig Dispense Refill    hydroCHLOROthiazide (HYDRODIURIL) 25 mg tablet Take 1 Tablet by mouth daily. 90 Tablet 1    famotidine (PEPCID) 20 mg tablet TAKE 1 TABLET BY MOUTH TWICE DAILY      cyclobenzaprine (FLEXERIL) 5 mg tablet Take 1 Tablet by mouth three (3) times daily as needed for Muscle Spasm(s).  (Patient not taking: Reported on 11/17/2021) 30 Tablet 3       Allergies   Allergen Reactions    Lozol [Indapamide] Other (comments)     Severe cramping in stomach area    Amoxicillin Diarrhea    Losartan Other (comments)     Depression       Past Medical History:   Diagnosis Date    GERD (gastroesophageal reflux disease)     uses OTC meds    Hypertension     no meds as of Jan 2018    Morbid obesity ([facility] [address]     Morbid obesity with body mass index of 40.0-49.9 ([facility] [address]        Past Surgical History:   Procedure Laterality Date    HX KNEE ARTHROSCOPY      HX ORTHOPAEDIC      ankle surgery / no hardware    HX TONSILLECTOMY         Family History   Problem Relation Age of Onset    Cancer Mother     Hypertension Mother     Diabetes Mother     Diabetes Father     Hypertension Father        Social History     Tobacco Use    Smoking status: Never Smoker    Smokeless tobacco: Never Used   Substance Use Topics    Alcohol use: Not Currently     Alcohol/week: 6.0 standard drinks     Types: 6 Shots of liquor per week       ROS   Review of Systems   Constitutional: Negative for chills and fever. HENT: Negative for ear pain and sore throat. Eyes: Negative for blurred vision and pain. Respiratory: Negative for cough and shortness of breath. Cardiovascular: Negative for chest pain. Gastrointestinal: Negative for abdominal pain, blood in stool and melena. Genitourinary: Negative for dysuria and hematuria. Musculoskeletal: Positive for joint pain. Negative for myalgias. Skin: Negative for rash. Neurological: Negative for headaches. Endo/Heme/Allergies: Does not bruise/bleed easily. Psychiatric/Behavioral: Negative for substance abuse.        Objective     Vitals:    05/25/22 1401 05/25/22 1409   BP: (!) 159/112 (!) 146/100   Pulse: 91    Resp: 16    Temp: 97.4 °F (36.3 °C)    TempSrc: Temporal    SpO2: 98%    Weight: 324 lb (147 kg)    Height: 6' 4\" (1.93 m)    PainSc:   1    PainLoc: Generalized        Physical Exam  Vitals and nursing note reviewed. Constitutional:       Appearance: Normal appearance. HENT:      Head: Normocephalic and atraumatic. Right Ear: External ear normal.      Left Ear: External ear normal.   Eyes:      Extraocular Movements: Extraocular movements intact. Conjunctiva/sclera: Conjunctivae normal.   Cardiovascular:      Rate and Rhythm: Normal rate and regular rhythm. Pulses: Normal pulses. Heart sounds: Normal heart sounds. Pulmonary:      Effort: Pulmonary effort is normal.      Breath sounds: Normal breath sounds. Abdominal:      General: Abdomen is flat. Bowel sounds are normal. There is no distension. Palpations: Abdomen is soft. Tenderness: There is no abdominal tenderness. Musculoskeletal:         General: No swelling (BUE) or tenderness (BUE). Cervical back: Neck supple. Comments: His right foot is tender to palpation along his first MTP joint. Lymphadenopathy:      Cervical: No cervical adenopathy. Skin:     General: Skin is warm and dry. Findings: No erythema. Neurological:      Mental Status: He is alert. Mental status is at baseline.       Gait: Gait normal.   Psychiatric:         Mood and Affect: Mood normal.         LABS   Data Review:   Lab Results   Component Value Date/Time    WBC 6.4 09/28/2021 02:36 PM    HGB 14.5 09/28/2021 02:36 PM    HCT 47.4 09/28/2021 02:36 PM    PLATELET 491 30/48/1349 02:36 PM    MCV 86 09/28/2021 02:36 PM       Lab Results   Component Value Date/Time    Sodium 143 05/18/2022 09:25 AM    Potassium 3.9 05/18/2022 09:25 AM    Chloride 99 05/18/2022 09:25 AM    CO2 30 05/18/2022 09:25 AM    Anion gap 14.0 05/18/2022 09:25 AM    Glucose 88 05/18/2022 09:25 AM    BUN 18 05/18/2022 09:25 AM    Creatinine 1.2 05/18/2022 09:25 AM    BUN/Creatinine ratio 10 04/08/2020 08:13 AM    GFR est  04/08/2020 08:13 AM    GFR est non-AA 87 04/08/2020 08:13 AM    Calcium 9.9 05/18/2022 09:25 AM       Lab Results   Component Value Date/Time    Cholesterol, total 212 (H) 05/18/2022 09:25 AM    HDL Cholesterol 78 05/18/2022 09:25 AM    LDL, calculated 116 (H) 05/18/2022 09:25 AM    VLDL, calculated 18 05/18/2022 09:25 AM    Triglyceride 91 05/18/2022 09:25 AM    CHOL/HDL Ratio 2.6 06/26/2019 08:48 AM       Lab Results   Component Value Date/Time    Hemoglobin A1c 5.3 05/18/2022 09:25 AM       Assessment/Plan:   1. Primary hypertension: His blood pressure is not at goal.  His blood pressure is 146/100. He has gained weight as well. Continue with HCTZ. I adding amlodipine 5 mg daily.  Return to clinic for BP check. ORDERS:  - amLODIPine (NORVASC) 5 mg tablet; Take 1 Tablet by mouth daily. Dispense: 90 Tablet; Refill: 3    2. Great toe pain, right: I suspect his pain is from gout. I encouraged him to avoid shellfish   Ordering a course of prednisone.  Checking a uric acid level.  Ordering a right foot x-ray. ORDERS:  - predniSONE (DELTASONE) 20 mg tablet; Take 2 Tablets by mouth daily (with breakfast). Dispense: 10 Tablet; Refill: 0  - URIC ACID; Future  - XR FOOT RT MIN 3 V; Future    3. Prediabetes: Resolved per recent labs.  We will need to check a follow-up A1c later this year. 4. HLD: LDL is 116.  I encouraged him to reduce his shellfish consumption.  I encouraged him to also continue exercising and maintaining a heart healthy low-carb diet. We discussed the importance of weight reduction as well. Health Maintenance Due   Topic Date Due    Hepatitis C Screening  Never done    COVID-19 Vaccine (1) Never done         Lab review: labs are reviewed in the EHR and ordered as mentioned above. I have discussed the diagnosis with the patient and the intended plan as seen in the above orders. The patient has received an after-visit summary and questions were answered concerning future plans. I have discussed medication side effects and warnings with the patient as well.  I have reviewed the plan of care with the patient, accepted their input and they are in agreement with the treatment goals. All questions were answered. The patient understands the plan of care. Handouts provided today with above information. Pt instructed if symptoms worsen to call the office or report to the ED for continued care. Greater than 50% of the visit time was spent in counseling and/or coordination of care. Voice recognition was used to generate this report, which may have resulted in some phonetic based errors in grammar and contents. Even though attempts were made to correct all the mistakes, some may have been missed, and remained in the body of the document.           Jorge Luis Faustin MD

## 2022-05-25 NOTE — PATIENT INSTRUCTIONS

## 2022-05-29 DIAGNOSIS — I10 PRIMARY HYPERTENSION: ICD-10-CM

## 2022-05-29 RX ORDER — HYDROCHLOROTHIAZIDE 25 MG/1
TABLET ORAL
Qty: 90 TABLET | Refills: 1 | Status: SHIPPED | OUTPATIENT
Start: 2022-05-29

## 2022-12-04 DIAGNOSIS — I10 PRIMARY HYPERTENSION: ICD-10-CM

## 2022-12-04 RX ORDER — HYDROCHLOROTHIAZIDE 25 MG/1
TABLET ORAL
Qty: 90 TABLET | Refills: 1 | Status: SHIPPED | OUTPATIENT
Start: 2022-12-04

## 2023-06-15 DIAGNOSIS — I10 ESSENTIAL (PRIMARY) HYPERTENSION: ICD-10-CM

## 2023-06-15 RX ORDER — HYDROCHLOROTHIAZIDE 25 MG/1
TABLET ORAL
Qty: 90 TABLET | Refills: 1 | OUTPATIENT
Start: 2023-06-15

## 2023-07-12 DIAGNOSIS — I10 ESSENTIAL (PRIMARY) HYPERTENSION: ICD-10-CM

## 2023-07-12 RX ORDER — AMLODIPINE BESYLATE 5 MG/1
TABLET ORAL
Qty: 30 TABLET | Refills: 1 | Status: SHIPPED | OUTPATIENT
Start: 2023-07-12 | End: 2023-08-15

## 2023-07-25 DIAGNOSIS — I10 ESSENTIAL (PRIMARY) HYPERTENSION: ICD-10-CM

## 2023-07-25 RX ORDER — HYDROCHLOROTHIAZIDE 25 MG/1
25 TABLET ORAL DAILY
Qty: 90 TABLET | Refills: 0 | Status: SHIPPED | OUTPATIENT
Start: 2023-07-25

## 2023-08-15 DIAGNOSIS — I10 ESSENTIAL (PRIMARY) HYPERTENSION: ICD-10-CM

## 2023-08-15 RX ORDER — AMLODIPINE BESYLATE 5 MG/1
TABLET ORAL
Qty: 30 TABLET | Refills: 0 | Status: SHIPPED | OUTPATIENT
Start: 2023-08-15 | End: 2023-08-25 | Stop reason: ALTCHOICE

## 2023-08-24 SDOH — ECONOMIC STABILITY: INCOME INSECURITY: HOW HARD IS IT FOR YOU TO PAY FOR THE VERY BASICS LIKE FOOD, HOUSING, MEDICAL CARE, AND HEATING?: NOT HARD AT ALL

## 2023-08-25 ENCOUNTER — NURSE ONLY (OUTPATIENT)
Age: 43
End: 2023-08-25

## 2023-08-25 ENCOUNTER — OFFICE VISIT (OUTPATIENT)
Age: 43
End: 2023-08-25
Payer: COMMERCIAL

## 2023-08-25 VITALS
SYSTOLIC BLOOD PRESSURE: 144 MMHG | HEIGHT: 76 IN | DIASTOLIC BLOOD PRESSURE: 98 MMHG | OXYGEN SATURATION: 99 % | TEMPERATURE: 97.9 F | HEART RATE: 92 BPM | RESPIRATION RATE: 20 BRPM | BODY MASS INDEX: 39.44 KG/M2

## 2023-08-25 DIAGNOSIS — R73.02 IMPAIRED GLUCOSE TOLERANCE: ICD-10-CM

## 2023-08-25 DIAGNOSIS — K76.0 NAFLD (NONALCOHOLIC FATTY LIVER DISEASE): ICD-10-CM

## 2023-08-25 DIAGNOSIS — G89.29 CHRONIC PAIN OF BOTH KNEES: ICD-10-CM

## 2023-08-25 DIAGNOSIS — M25.562 CHRONIC PAIN OF BOTH KNEES: ICD-10-CM

## 2023-08-25 DIAGNOSIS — M25.572 BILATERAL ANKLE PAIN, UNSPECIFIED CHRONICITY: ICD-10-CM

## 2023-08-25 DIAGNOSIS — M25.571 BILATERAL ANKLE PAIN, UNSPECIFIED CHRONICITY: ICD-10-CM

## 2023-08-25 DIAGNOSIS — M25.561 CHRONIC PAIN OF BOTH KNEES: ICD-10-CM

## 2023-08-25 DIAGNOSIS — I10 ESSENTIAL (PRIMARY) HYPERTENSION: ICD-10-CM

## 2023-08-25 DIAGNOSIS — E79.0 HYPERURICEMIA: ICD-10-CM

## 2023-08-25 DIAGNOSIS — Z80.42 FAMILY HISTORY OF PROSTATE CANCER: ICD-10-CM

## 2023-08-25 DIAGNOSIS — I10 ESSENTIAL (PRIMARY) HYPERTENSION: Primary | ICD-10-CM

## 2023-08-25 DIAGNOSIS — Z12.11 ENCOUNTER FOR SCREENING FOR MALIGNANT NEOPLASM OF COLON: ICD-10-CM

## 2023-08-25 PROCEDURE — 3078F DIAST BP <80 MM HG: CPT | Performed by: INTERNAL MEDICINE

## 2023-08-25 PROCEDURE — 3074F SYST BP LT 130 MM HG: CPT | Performed by: INTERNAL MEDICINE

## 2023-08-25 PROCEDURE — 99214 OFFICE O/P EST MOD 30 MIN: CPT | Performed by: INTERNAL MEDICINE

## 2023-08-25 RX ORDER — AMLODIPINE BESYLATE 10 MG/1
10 TABLET ORAL DAILY
Qty: 30 TABLET | Refills: 3 | Status: SHIPPED | OUTPATIENT
Start: 2023-08-25

## 2023-08-25 SDOH — ECONOMIC STABILITY: INCOME INSECURITY: HOW HARD IS IT FOR YOU TO PAY FOR THE VERY BASICS LIKE FOOD, HOUSING, MEDICAL CARE, AND HEATING?: NOT HARD AT ALL

## 2023-08-25 SDOH — ECONOMIC STABILITY: HOUSING INSECURITY
IN THE LAST 12 MONTHS, WAS THERE A TIME WHEN YOU DID NOT HAVE A STEADY PLACE TO SLEEP OR SLEPT IN A SHELTER (INCLUDING NOW)?: NO

## 2023-08-25 SDOH — ECONOMIC STABILITY: FOOD INSECURITY: WITHIN THE PAST 12 MONTHS, THE FOOD YOU BOUGHT JUST DIDN'T LAST AND YOU DIDN'T HAVE MONEY TO GET MORE.: NEVER TRUE

## 2023-08-25 SDOH — ECONOMIC STABILITY: FOOD INSECURITY: WITHIN THE PAST 12 MONTHS, YOU WORRIED THAT YOUR FOOD WOULD RUN OUT BEFORE YOU GOT MONEY TO BUY MORE.: NEVER TRUE

## 2023-08-25 ASSESSMENT — PATIENT HEALTH QUESTIONNAIRE - PHQ9
SUM OF ALL RESPONSES TO PHQ QUESTIONS 1-9: 0
SUM OF ALL RESPONSES TO PHQ QUESTIONS 1-9: 0
SUM OF ALL RESPONSES TO PHQ9 QUESTIONS 1 & 2: 0
2. FEELING DOWN, DEPRESSED OR HOPELESS: 0
SUM OF ALL RESPONSES TO PHQ QUESTIONS 1-9: 0
SUM OF ALL RESPONSES TO PHQ QUESTIONS 1-9: 0
1. LITTLE INTEREST OR PLEASURE IN DOING THINGS: 0

## 2023-08-25 NOTE — PROGRESS NOTES
INTERNISTS [facility]:  8/31/2023, MRN: [id number]      Yobani Emerson is a 37 y.o. male and presents to clinic for Hypertension, Cholesterol Problem, and Other (PreDiabetic)      Subjective: The patient is a 69-year-old male with history of CALVILLO, hyperuricemia, vitamin D deficiency, obesity, prediabetes, GERD, and hypertension with white coat hypertension. 1.  Health maintenance:  - Overdue for colorectal cancer screening.  - He sprained his left ankle and saw an urgent care team. He was dx with an ankle sprain. His sx are improving. He also has chronic right knee pain off/on. He has a h/o right knee bursitis. Naproxen helps to relieve his knee pain in the past. Now his left knee is \"catching. \" Naproxen is no longer helping to relieve right ankle sx. He does not feel like his right ankle will give out on him. 2.  Hypertension: It's been over a year since I seen him in the office. Blood pressure today is 144/98. He is taking 5 mg daily of Norvasc and hydrochlorothiazide 25 mg daily. Overdue for labs. Labs were drawn today    3. Prediabetes/IGT: Overdue for labs. Labs drawn today    4. HLD/NAFLD: Overdue for labs. His last LDL from May of last year showed a measurement of 116. ETOH intake: 10 shots per wk. No drug use.  No vaping/tobacco.        Patient Active Problem List    Diagnosis Date Noted    Impaired glucose tolerance 07/03/2019    CALVILLO (nonalcoholic steatohepatitis) 08/31/2018    Hyperuricemia 08/31/2018    Vitamin D deficiency 08/31/2018    GERD (gastroesophageal reflux disease)     Morbid obesity ([address])     Hypertension 12/01/2015       Current Outpatient Medications   Medication Sig Dispense Refill    amLODIPine (NORVASC) 10 MG tablet Take 1 tablet by mouth daily 30 tablet 3    hydroCHLOROthiazide (HYDRODIURIL) 25 MG tablet Take 1 tablet by mouth daily 90 tablet 0    cyclobenzaprine (FLEXERIL) 5 MG tablet Take 1 tablet by mouth 3 times daily as needed      famotidine (PEPCID) 20 MG tablet

## 2023-08-26 LAB
A/G RATIO: 1.8 RATIO (ref 1.1–2.6)
ALBUMIN SERPL-MCNC: 4.3 G/DL (ref 3.5–5)
ALP BLD-CCNC: 71 U/L (ref 25–115)
ALT SERPL-CCNC: 20 U/L (ref 5–40)
ANION GAP SERPL CALCULATED.3IONS-SCNC: 12 MMOL/L (ref 3–15)
AST SERPL-CCNC: 15 U/L (ref 10–37)
AVERAGE GLUCOSE: 111 MG/DL (ref 91–123)
BILIRUB SERPL-MCNC: 0.5 MG/DL (ref 0.2–1.2)
BUN BLDV-MCNC: 18 MG/DL (ref 6–22)
C-REACTIVE PROTEIN: 0.7 MG/DL
CALCIUM SERPL-MCNC: 9.1 MG/DL (ref 8.4–10.5)
CHLORIDE BLD-SCNC: 100 MMOL/L (ref 98–110)
CHOLESTEROL/HDL RATIO: 2.9 (ref 0–5)
CHOLESTEROL: 197 MG/DL (ref 110–200)
CO2: 30 MMOL/L (ref 20–32)
CREAT SERPL-MCNC: 1.1 MG/DL (ref 0.5–1.2)
GLOBULIN: 2.4 G/DL (ref 2–4)
GLOMERULAR FILTRATION RATE: >60 ML/MIN/1.73 SQ.M.
GLUCOSE: 106 MG/DL (ref 70–99)
HBA1C MFR BLD: 5.5 % (ref 4.8–5.6)
HDLC SERPL-MCNC: 69 MG/DL
LDL CHOLESTEROL CALCULATED: 106 MG/DL (ref 50–99)
LDL/HDL RATIO: 1.5
NON-HDL CHOLESTEROL: 128 MG/DL
POTASSIUM SERPL-SCNC: 3.8 MMOL/L (ref 3.5–5.5)
SODIUM BLD-SCNC: 142 MMOL/L (ref 133–145)
TOTAL PROTEIN: 6.7 G/DL (ref 6.4–8.3)
TRIGL SERPL-MCNC: 111 MG/DL (ref 40–149)
URIC ACID: 9.5 MG/DL (ref 3.9–9)
VLDLC SERPL CALC-MCNC: 22 MG/DL (ref 8–30)

## 2023-08-28 LAB
CREATININE URINE: 524 MG/DL
MICROALB/CREAT RATIO (UG/MG CREAT.): 5.2 (ref 0–30)
MICROALBUMIN/CREAT 24H UR: 27.2 MG/L (ref 0.1–17)

## 2023-08-31 ASSESSMENT — ENCOUNTER SYMPTOMS
EYE PAIN: 0
ANAL BLEEDING: 0
SORE THROAT: 0
COUGH: 0
SHORTNESS OF BREATH: 0
ABDOMINAL PAIN: 0
BLOOD IN STOOL: 0

## 2023-09-01 RX ORDER — CYCLOBENZAPRINE HCL 5 MG
5 TABLET ORAL 3 TIMES DAILY PRN
Qty: 30 TABLET | Refills: 1 | Status: SHIPPED | OUTPATIENT
Start: 2023-09-01

## 2023-09-01 NOTE — TELEPHONE ENCOUNTER
----- Message from Tre Johnson sent at 8/28/2023  5:43 PM EDT -----  Regarding: Flexeril  Contact: [phone]  Good evening, I noticed that was prescribed flexeril but it was not called into the pharmacy. I got the increased amlodipine but not the other on.

## 2023-09-01 NOTE — TELEPHONE ENCOUNTER
----- Message from Cathyann Lundborg sent at 8/28/2023  6:09 PM EDT -----  Regarding: Question regarding MICROALBUMIN/CREAT URINE RATIO  Contact: [phone]  What does this mean? And how can I make it better?

## 2023-09-07 LAB
AVERAGE GLUCOSE: NORMAL
HBA1C MFR BLD: 5.5 %

## 2023-09-27 ENCOUNTER — OFFICE VISIT (OUTPATIENT)
Age: 43
End: 2023-09-27
Payer: COMMERCIAL

## 2023-09-27 VITALS — HEIGHT: 76 IN | BODY MASS INDEX: 39.44 KG/M2

## 2023-09-27 DIAGNOSIS — M19.172 POST-TRAUMATIC OSTEOARTHRITIS OF LEFT ANKLE: ICD-10-CM

## 2023-09-27 DIAGNOSIS — G89.29 CHRONIC PAIN OF BOTH ANKLES: ICD-10-CM

## 2023-09-27 DIAGNOSIS — M25.571 CHRONIC PAIN OF BOTH ANKLES: ICD-10-CM

## 2023-09-27 DIAGNOSIS — M25.572 CHRONIC PAIN OF BOTH ANKLES: ICD-10-CM

## 2023-09-27 DIAGNOSIS — M25.872 ANKLE IMPINGEMENT SYNDROME, LEFT: Primary | ICD-10-CM

## 2023-09-27 DIAGNOSIS — Z87.81 H/O FRACTURE OF ANKLE: ICD-10-CM

## 2023-09-27 PROCEDURE — 99203 OFFICE O/P NEW LOW 30 MIN: CPT | Performed by: ORTHOPAEDIC SURGERY

## 2023-09-27 SDOH — HEALTH STABILITY: PHYSICAL HEALTH: ON AVERAGE, HOW MANY DAYS PER WEEK DO YOU ENGAGE IN MODERATE TO STRENUOUS EXERCISE (LIKE A BRISK WALK)?: 0 DAYS

## 2023-09-27 NOTE — PATIENT INSTRUCTIONS
If we order a Diagnostic test (such as MRI or CT) during your office visit please see below:     Coordination of Care will be calling you to schedule your diagnostic test. If you have not heard from Coordination of Care within 2 business days, please call [phone]. Once you have a date scheduled for your diagnostic test, you will need to contact our office to schedule a follow up appointment about 4 days following the exam, as this is when the physician will review your diagnostic test results with you. You can contact our office to schedule appointment by phone at [phone], or you can send a message via [employer] to request an appointment.

## 2023-09-27 NOTE — PROGRESS NOTES
Amarilys Jointer   [address] [facility] [id number]       DIAGNOSTIC IMAGING      Orders Placed This Encounter   Procedures    [69989] Ankle Min 3V    [17231] Ankle Min 3V        X RAYS AT [facility]  9/27/2023     RIGHT ANKLE X-rays,NON WEIGHT BEARING  3 views, AP/LAT/OBL completed 9/27/2023  AT [facility]     X-rays reveal Osseous: No fractures or dislocations. No focal osteolytic or osteoblastic process. Bone Spurs: No significant bone spurs. Overall alignment is acceptable. Ankle mortise alignment is congruent, Tibial plafond and talar dome intact. Soft tissue swelling is not noted, No radiopaque foreign body and No abnormal calcific densities to soft tissues. No osteolytic or osteoblastic lesions noted, no projection x-ray images. Mineralization suggests no osteopenia. Degenerative changes/Joint Condition: No Significant OA is not noted. No Significant OA changes present. No Osteochondral defects see. Calcified vessels are not present. I have personally reviewed the results of the above study and the interpretation of this study is my professional opinion. LEFT ANKLE X-rays,NON WEIGHT BEARING  3 views, AP/LAT/OBL completed 9/27/2023  AT [facility]     X-rays reveal Osseous: Healed remote left ankle fracture, with some evidence of a more exuberant bone, to the posterior and lateral portion of tibia, with some thickening of the fibula,. No fractures or dislocations. No focal osteolytic or osteoblastic process. Bone Spurs: No significant bone spurs. Overall alignment is acceptable. Ankle mortise alignment is congruent, Tibial plafond and talar dome intact. Soft tissue swelling is not noted, No radiopaque foreign body and No abnormal calcific densities to soft tissues. No osteolytic or osteoblastic lesions noted, no projection x-ray images. Mineralization suggests no osteopenia.  Degenerative changes/Joint Condition: There is a mild

## 2023-10-06 ENCOUNTER — OFFICE VISIT (OUTPATIENT)
Age: 43
End: 2023-10-06
Payer: COMMERCIAL

## 2023-10-06 VITALS
HEART RATE: 100 BPM | SYSTOLIC BLOOD PRESSURE: 144 MMHG | TEMPERATURE: 97.7 F | BODY MASS INDEX: 38.36 KG/M2 | HEIGHT: 76 IN | WEIGHT: 315 LBS | DIASTOLIC BLOOD PRESSURE: 80 MMHG | RESPIRATION RATE: 16 BRPM | OXYGEN SATURATION: 98 %

## 2023-10-06 DIAGNOSIS — R06.83 SNORING: ICD-10-CM

## 2023-10-06 DIAGNOSIS — E78.5 HYPERLIPIDEMIA, UNSPECIFIED HYPERLIPIDEMIA TYPE: ICD-10-CM

## 2023-10-06 DIAGNOSIS — I10 ESSENTIAL (PRIMARY) HYPERTENSION: Primary | ICD-10-CM

## 2023-10-06 DIAGNOSIS — Z12.11 ENCOUNTER FOR SCREENING FOR MALIGNANT NEOPLASM OF COLON: ICD-10-CM

## 2023-10-06 DIAGNOSIS — R73.02 IMPAIRED GLUCOSE TOLERANCE: ICD-10-CM

## 2023-10-06 PROCEDURE — 99214 OFFICE O/P EST MOD 30 MIN: CPT | Performed by: INTERNAL MEDICINE

## 2023-10-06 PROCEDURE — 3078F DIAST BP <80 MM HG: CPT | Performed by: INTERNAL MEDICINE

## 2023-10-06 PROCEDURE — 3074F SYST BP LT 130 MM HG: CPT | Performed by: INTERNAL MEDICINE

## 2023-10-06 ASSESSMENT — ENCOUNTER SYMPTOMS
SHORTNESS OF BREATH: 0
COUGH: 0
ANAL BLEEDING: 0
ABDOMINAL PAIN: 0
SORE THROAT: 0
BLOOD IN STOOL: 0
EYE PAIN: 0

## 2023-10-12 PROBLEM — E78.5 HYPERLIPIDEMIA: Status: ACTIVE | Noted: 2023-10-12

## 2023-10-30 LAB — NONINV COLON CA DNA+OCC BLD SCRN STL QL: NEGATIVE

## 2023-11-02 ENCOUNTER — CLINICAL DOCUMENTATION (OUTPATIENT)
Age: 43
End: 2023-11-02

## 2023-11-20 DIAGNOSIS — I10 ESSENTIAL (PRIMARY) HYPERTENSION: ICD-10-CM

## 2023-11-21 RX ORDER — AMLODIPINE BESYLATE 10 MG/1
10 TABLET ORAL DAILY
Qty: 90 TABLET | Refills: 1 | Status: SHIPPED | OUTPATIENT
Start: 2023-11-21

## 2024-02-19 ENCOUNTER — OFFICE VISIT (OUTPATIENT)
Age: 44
End: 2024-02-19
Payer: COMMERCIAL

## 2024-02-19 VITALS
DIASTOLIC BLOOD PRESSURE: 98 MMHG | TEMPERATURE: 98 F | RESPIRATION RATE: 18 BRPM | HEIGHT: 74 IN | OXYGEN SATURATION: 96 % | SYSTOLIC BLOOD PRESSURE: 147 MMHG | WEIGHT: 315 LBS | HEART RATE: 94 BPM | BODY MASS INDEX: 40.43 KG/M2

## 2024-02-19 DIAGNOSIS — G47.19 EXCESSIVE DAYTIME SLEEPINESS: ICD-10-CM

## 2024-02-19 DIAGNOSIS — E55.9 VITAMIN D DEFICIENCY: ICD-10-CM

## 2024-02-19 DIAGNOSIS — R06.83 LOUD SNORING: Primary | ICD-10-CM

## 2024-02-19 DIAGNOSIS — I10 PRIMARY HYPERTENSION: ICD-10-CM

## 2024-02-19 DIAGNOSIS — K21.9 GASTROESOPHAGEAL REFLUX DISEASE WITHOUT ESOPHAGITIS: ICD-10-CM

## 2024-02-19 DIAGNOSIS — R73.02 IMPAIRED GLUCOSE TOLERANCE: ICD-10-CM

## 2024-02-19 DIAGNOSIS — R29.818 SUSPECTED SLEEP APNEA: ICD-10-CM

## 2024-02-19 DIAGNOSIS — E66.01 MORBID OBESITY (HCC): ICD-10-CM

## 2024-02-19 DIAGNOSIS — G47.63 BRUXISM, SLEEP-RELATED: ICD-10-CM

## 2024-02-19 PROCEDURE — 3080F DIAST BP >= 90 MM HG: CPT | Performed by: OTOLARYNGOLOGY

## 2024-02-19 PROCEDURE — 99204 OFFICE O/P NEW MOD 45 MIN: CPT | Performed by: OTOLARYNGOLOGY

## 2024-02-19 PROCEDURE — 3077F SYST BP >= 140 MM HG: CPT | Performed by: OTOLARYNGOLOGY

## 2024-02-19 ASSESSMENT — SLEEP AND FATIGUE QUESTIONNAIRES
HOW LIKELY ARE YOU TO NOD OFF OR FALL ASLEEP WHILE WATCHING TV: 0
HOW LIKELY ARE YOU TO NOD OFF OR FALL ASLEEP IN A CAR, WHILE STOPPED FOR A FEW MINUTES IN TRAFFIC: 1
HOW LIKELY ARE YOU TO NOD OFF OR FALL ASLEEP WHEN YOU ARE A PASSENGER IN A CAR FOR AN HOUR WITHOUT A BREAK: 3
NECK CIRCUMFERENCE (INCHES): 16
HOW LIKELY ARE YOU TO NOD OFF OR FALL ASLEEP WHILE SITTING INACTIVE IN A PUBLIC PLACE: 0
HOW LIKELY ARE YOU TO NOD OFF OR FALL ASLEEP WHILE SITTING AND TALKING TO SOMEONE: 0
HOW LIKELY ARE YOU TO NOD OFF OR FALL ASLEEP WHILE SITTING AND READING: 2
ESS TOTAL SCORE: 9
HOW LIKELY ARE YOU TO NOD OFF OR FALL ASLEEP WHILE SITTING QUIETLY AFTER LUNCH WITHOUT ALCOHOL: 0
HOW LIKELY ARE YOU TO NOD OFF OR FALL ASLEEP WHILE LYING DOWN TO REST IN THE AFTERNOON WHEN CIRCUMSTANCES PERMIT: 3

## 2024-02-19 ASSESSMENT — PATIENT HEALTH QUESTIONNAIRE - PHQ9
SUM OF ALL RESPONSES TO PHQ9 QUESTIONS 1 & 2: 0
1. LITTLE INTEREST OR PLEASURE IN DOING THINGS: 0
SUM OF ALL RESPONSES TO PHQ QUESTIONS 1-9: 0
2. FEELING DOWN, DEPRESSED OR HOPELESS: 0

## 2024-02-19 NOTE — PROGRESS NOTES
[facility]   Sleep Medicine     Office Progress Note - Initial Evaluation        [address]  [address]  [facility] [address] [phone] Fax    Reason for visit/referral: Evaluation for possible obstructive sleep apnea/sleep disordered breathing  Assessment:      1. Loud snoring  -     PAT - Home Sleep Test; Future  2. Suspected sleep apnea  -     PAT - Home Sleep Test; Future  3. Excessive daytime sleepiness  4. Morbid obesity (HCC)  5. Bruxism, sleep-related  6. Impaired glucose tolerance  Assessment & Plan:  Last HgBA1C was 5.5 8/2023. Has another lab pending I believe. Managed by PCP  7. Primary hypertension  Assessment & Plan:   Asymptomatic, continue current medications, mildly elevated today, f/u with PCP. Takes 10 of Norvasc (which he thinks makes him sleepy) and 25 of HCTZ.   8. Gastroesophageal reflux disease without esophagitis  Overview:  uses OTC meds      9. Vitamin D deficiency      I have discussed the nature and definition of obstructive sleep apnea and why it would be important to evaluate for this.  We did discuss how undiagnosed/untreated obstructive sleep apnea can affect other medical conditions/disorders, and in particular, cardiovascular disorders.  He is taking 2 antihypertensives and his blood pressure is still elevated, hence the reason for this referral I suspect.    He is not complaining a whole lot about sleep but he does state that his wife notes that when he sleeps on his back he snores very, very loudly.  He is somewhat sleepy during the day and would prefer to nap daily if he was able.  He does feel better after he wakes up from 30 to 60-minute nap.    We did discuss the different kinds of sleep studies and I believe a home sleep apnea test is a reasonable option in this setting.  I will order this study and have the patient follow-up in the office to go over the results.  We did also briefly discuss CPAP, weight loss and oral

## 2024-02-19 NOTE — ASSESSMENT & PLAN NOTE
Asymptomatic, continue current medications, mildly elevated today, f/u with PCP. Takes 10 of Norvasc (which he thinks makes him sleepy) and 25 of HCTZ.

## 2024-02-19 NOTE — PROGRESS NOTES
Alexx Yoo presents today for   Chief Complaint   Patient presents with    Snoring    Fatigue       Is someone accompanying this pt? no    Is the patient using any DME equipment during OV? no    -DME Company N/A    Have you ever had a sleep study done before? no    Depression Screenin/19/2024     8:38 AM   PHQ-9    Little interest or pleasure in doing things 0   Feeling down, depressed, or hopeless 0   PHQ-2 Score 0   PHQ-9 Total Score 0        [place] Sleepiness Scale:      2024     8:41 AM   Sleep Medicine   Sitting and reading 2   Watching TV 0   Sitting, inactive in a public place (e.g. a theatre or a meeting) 0   As a passenger in a car for an hour without a break 3   Lying down to rest in the afternoon when circumstances permit 3   Sitting and talking to someone 0   Sitting quietly after a lunch without alcohol 0   In a car, while stopped for a few minutes in traffic 1   [place] Sleepiness Score 9   Neck circumference (Inches) 16       Stop-Ban/19/2024     8:00 AM   STOP-BANG QUESTIONNAIRE   Are you a loud and/or regular snorer? 1   Do you often feel tired or groggy upon awakening or do you awaken with a headache? 1   Have you been observed to gasp or stop breathing during sleep? 0   Are you often tired or fatigued during wake time hours?  0   Do you fall asleep sitting, reading, watching TV or driving? 0   Do you often have problems with memory or concentration? 0   Do you have or are you being treated for high blood pressure? 1   Recent BMI (Calculated) 40.1   Is BMI greater than 35 kg/m2? 1=Yes   Age older than 50 years old? 0=No   Is your neck circumference greater than 17 inches (Male) or 16 inches (Female)? 0   Gender - Male 1=Yes   STOP-Bang Total Score 45.1         Coordination of Care:  1. Have you been to the ER, urgent care clinic since your last visit? Hospitalized since your last visit? no    2. Have you seen or consulted any other health care providers outside of the

## 2024-02-19 NOTE — PATIENT INSTRUCTIONS
device     Safety is strongly encouraged.  You should not drive if sleepy, tired, distracted and/or fatigued.      Chest Xrays and blood work do not require appointments.  They are considered \"Walk-In\" services and can be obtained at either [facility] or [facility].  Blood work is performed at the Laboratory (Lab) from 08:00am - 04:00 pm (if applicable to your visit)

## 2024-03-05 ENCOUNTER — HOSPITAL ENCOUNTER (OUTPATIENT)
Dept: SLEEP MEDICINE | Facility: HOSPITAL | Age: 44
Discharge: HOME | End: 2024-03-08
Attending: OTOLARYNGOLOGY
Payer: COMMERCIAL

## 2024-03-05 DIAGNOSIS — R06.83 LOUD SNORING: ICD-10-CM

## 2024-03-05 DIAGNOSIS — R29.818 SUSPECTED SLEEP APNEA: ICD-10-CM

## 2024-03-05 PROCEDURE — 95800 SLP STDY UNATTENDED: CPT

## 2024-03-11 PROBLEM — G47.33 OBSTRUCTIVE SLEEP APNEA (ADULT) (PEDIATRIC): Status: ACTIVE | Noted: 2024-03-11

## 2024-03-11 PROBLEM — R06.83 LOUD SNORING: Status: ACTIVE | Noted: 2024-03-11

## 2024-03-12 DIAGNOSIS — G47.33 MODERATE OBSTRUCTIVE SLEEP APNEA: Primary | ICD-10-CM

## 2024-03-14 ENCOUNTER — CLINICAL DOCUMENTATION (OUTPATIENT)
Age: 44
End: 2024-03-14

## 2024-04-02 LAB
ESTIMATED AVERAGE GLUCOSE: 112 MG/DL (ref 91–123)
HBA1C MFR BLD: 5.5 % (ref 4.8–5.6)

## 2024-04-08 ENCOUNTER — OFFICE VISIT (OUTPATIENT)
Age: 44
End: 2024-04-08
Payer: COMMERCIAL

## 2024-04-08 VITALS
OXYGEN SATURATION: 99 % | WEIGHT: 315 LBS | BODY MASS INDEX: 40.43 KG/M2 | DIASTOLIC BLOOD PRESSURE: 85 MMHG | HEIGHT: 74 IN | SYSTOLIC BLOOD PRESSURE: 118 MMHG | HEART RATE: 99 BPM | TEMPERATURE: 98.9 F | RESPIRATION RATE: 18 BRPM

## 2024-04-08 DIAGNOSIS — G47.33 OSA (OBSTRUCTIVE SLEEP APNEA): ICD-10-CM

## 2024-04-08 DIAGNOSIS — I10 ESSENTIAL (PRIMARY) HYPERTENSION: Primary | ICD-10-CM

## 2024-04-08 DIAGNOSIS — R73.02 IMPAIRED GLUCOSE TOLERANCE: ICD-10-CM

## 2024-04-08 PROBLEM — G47.19 EXCESSIVE DAYTIME SLEEPINESS: Status: RESOLVED | Noted: 2024-02-19 | Resolved: 2024-04-08

## 2024-04-08 PROBLEM — R06.83 LOUD SNORING: Status: RESOLVED | Noted: 2024-03-11 | Resolved: 2024-04-08

## 2024-04-08 PROCEDURE — 3079F DIAST BP 80-89 MM HG: CPT | Performed by: INTERNAL MEDICINE

## 2024-04-08 PROCEDURE — 3074F SYST BP LT 130 MM HG: CPT | Performed by: INTERNAL MEDICINE

## 2024-04-08 PROCEDURE — 99214 OFFICE O/P EST MOD 30 MIN: CPT | Performed by: INTERNAL MEDICINE

## 2024-04-08 ASSESSMENT — ENCOUNTER SYMPTOMS
ABDOMINAL PAIN: 0
EYE PAIN: 0
COUGH: 0
BLOOD IN STOOL: 0
ANAL BLEEDING: 0
SHORTNESS OF BREATH: 0
SORE THROAT: 0

## 2024-04-08 NOTE — PROGRESS NOTES
Alexx Yoo presents today for   Chief Complaint   Patient presents with    Follow-up     6 mon F/u    Hypertension     6 mon F/u    Cholesterol Problem     6 mon F/u           \"Have you been to the ER, urgent care clinic since your last visit?  Hospitalized since your last visit?\"    NO    “Have you seen or consulted any other health care providers outside of [facility] since your last visit?”    NO

## 2024-04-08 NOTE — PROGRESS NOTES
INTERNISTS OF [facility]:  4/8/2024, MRN: [id number]      Alexx Yoo is a 44 y.o. male and presents to clinic for Follow-up (6 mon F/u), Hypertension (6 mon F/u), and Cholesterol Problem (6 mon F/u)      Subjective:   The patient is a 44-year-old male with history of CALVILLO, hyperuricemia, vitamin D deficiency, obesity, prediabetes, GERD, JAJA (followed by ), and hypertension with white coat hypertension.     1.  Hypertension and JAJA: Blood pressure is 118/85.  +On Norvasc 10 mg daily and HCTZ 25 mg daily.  He has a history of snoring and at his last appointment, I placed a referral to sleep medicine to rule out JAJA.  Today he reports: he completed a home sleep study. He is positive for JAJA per this study.  He has yet to get his CPAP machine and is waiting to get scheduled to get supplies and education regarding how to use the machine.    2. Prediabetes: His most recent lab work shows that his A1c is 5.5.      Patient Active Problem List    Diagnosis Date Noted    Obstructive sleep apnea (adult) (pediatric) 03/11/2024    Bruxism, sleep-related 02/19/2024    Hyperlipidemia 10/12/2023    Impaired glucose tolerance 07/03/2019    CALVILLO (nonalcoholic steatohepatitis) 08/31/2018    Hyperuricemia 08/31/2018    Vitamin D deficiency 08/31/2018    GERD (gastroesophageal reflux disease)     Morbid obesity (HCC)     Hypertension 12/01/2015       Current Outpatient Medications   Medication Sig Dispense Refill    amLODIPine (NORVASC) 10 MG tablet TAKE 1 TABLET BY MOUTH EVERY DAY 90 tablet 1    hydroCHLOROthiazide (HYDRODIURIL) 25 MG tablet TAKE 1 TABLET BY MOUTH EVERY DAY 90 tablet 1    famotidine (PEPCID) 20 MG tablet TAKE 1 TABLET BY MOUTH TWICE DAILY      cyclobenzaprine (FLEXERIL) 5 MG tablet Take 1 tablet by mouth 3 times daily as needed for Muscle spasms (Patient not taking: Reported on 4/8/2024) 30 tablet 1     No current facility-administered medications for this visit.       Allergies   Allergen Reactions

## 2024-05-05 DIAGNOSIS — I10 ESSENTIAL (PRIMARY) HYPERTENSION: ICD-10-CM

## 2024-05-05 RX ORDER — HYDROCHLOROTHIAZIDE 25 MG/1
25 TABLET ORAL DAILY
Qty: 90 TABLET | Refills: 1 | Status: SHIPPED | OUTPATIENT
Start: 2024-05-05

## 2024-06-09 DIAGNOSIS — I10 ESSENTIAL (PRIMARY) HYPERTENSION: ICD-10-CM

## 2024-06-10 RX ORDER — AMLODIPINE BESYLATE 10 MG/1
10 TABLET ORAL DAILY
Qty: 90 TABLET | Refills: 1 | Status: SHIPPED | OUTPATIENT
Start: 2024-06-10

## 2024-06-26 ENCOUNTER — OFFICE VISIT (OUTPATIENT)
Age: 44
End: 2024-06-26
Payer: COMMERCIAL

## 2024-06-26 VITALS
SYSTOLIC BLOOD PRESSURE: 140 MMHG | WEIGHT: 315 LBS | HEART RATE: 82 BPM | BODY MASS INDEX: 38.36 KG/M2 | OXYGEN SATURATION: 96 % | TEMPERATURE: 97.6 F | RESPIRATION RATE: 18 BRPM | HEIGHT: 76 IN | DIASTOLIC BLOOD PRESSURE: 89 MMHG

## 2024-06-26 DIAGNOSIS — G47.33 OBSTRUCTIVE SLEEP APNEA (ADULT) (PEDIATRIC): Primary | ICD-10-CM

## 2024-06-26 DIAGNOSIS — G47.63 BRUXISM, SLEEP-RELATED: ICD-10-CM

## 2024-06-26 DIAGNOSIS — G47.19 EXCESSIVE DAYTIME SLEEPINESS: ICD-10-CM

## 2024-06-26 DIAGNOSIS — E66.01 MORBID OBESITY (HCC): ICD-10-CM

## 2024-06-26 DIAGNOSIS — I10 PRIMARY HYPERTENSION: ICD-10-CM

## 2024-06-26 DIAGNOSIS — R73.02 IMPAIRED GLUCOSE TOLERANCE: ICD-10-CM

## 2024-06-26 PROCEDURE — 99214 OFFICE O/P EST MOD 30 MIN: CPT | Performed by: OTOLARYNGOLOGY

## 2024-06-26 PROCEDURE — 3077F SYST BP >= 140 MM HG: CPT | Performed by: OTOLARYNGOLOGY

## 2024-06-26 PROCEDURE — 3079F DIAST BP 80-89 MM HG: CPT | Performed by: OTOLARYNGOLOGY

## 2024-06-26 ASSESSMENT — PATIENT HEALTH QUESTIONNAIRE - PHQ9
SUM OF ALL RESPONSES TO PHQ9 QUESTIONS 1 & 2: 0
SUM OF ALL RESPONSES TO PHQ QUESTIONS 1-9: 0
2. FEELING DOWN, DEPRESSED OR HOPELESS: NOT AT ALL
1. LITTLE INTEREST OR PLEASURE IN DOING THINGS: NOT AT ALL
SUM OF ALL RESPONSES TO PHQ QUESTIONS 1-9: 0

## 2024-06-26 ASSESSMENT — SLEEP AND FATIGUE QUESTIONNAIRES
HOW LIKELY ARE YOU TO NOD OFF OR FALL ASLEEP WHILE WATCHING TV: SLIGHT CHANCE OF DOZING
HOW LIKELY ARE YOU TO NOD OFF OR FALL ASLEEP WHILE SITTING INACTIVE IN A PUBLIC PLACE: WOULD NEVER DOZE
HOW LIKELY ARE YOU TO NOD OFF OR FALL ASLEEP WHEN YOU ARE A PASSENGER IN A CAR FOR AN HOUR WITHOUT A BREAK: MODERATE CHANCE OF DOZING
HOW LIKELY ARE YOU TO NOD OFF OR FALL ASLEEP WHILE LYING DOWN TO REST IN THE AFTERNOON WHEN CIRCUMSTANCES PERMIT: WOULD NEVER DOZE
HOW LIKELY ARE YOU TO NOD OFF OR FALL ASLEEP WHILE SITTING QUIETLY AFTER LUNCH WITHOUT ALCOHOL: WOULD NEVER DOZE
HOW LIKELY ARE YOU TO NOD OFF OR FALL ASLEEP WHILE SITTING AND READING: SLIGHT CHANCE OF DOZING
HOW LIKELY ARE YOU TO NOD OFF OR FALL ASLEEP IN A CAR, WHILE STOPPED FOR A FEW MINUTES IN TRAFFIC: WOULD NEVER DOZE
ESS TOTAL SCORE: 4
HOW LIKELY ARE YOU TO NOD OFF OR FALL ASLEEP WHILE SITTING AND TALKING TO SOMEONE: WOULD NEVER DOZE

## 2024-06-26 NOTE — PATIENT INSTRUCTIONS
Please make a follow up appointment to discuss the results of your sleep study. If this is impossible for some reason, please send me a \"My Chart\" message so that I may get back with you in a timely manner.    The [facility] Sleep Lab is located in the [place] [facility], adjacent to [facility]. The lab is on the second floor. The direct number to call for sleep study related questions is: [phone].    Please call our clinic back at [phone] or send a message on [employer] if you have any questions or concerns or if you are experiencing any of the following:     You have not received a follow up appointment within 30 days prior the recommended follow up time.    If you are not tolerating treatment plan and/or not able to obtain equipment or prescribed medication(s).  if you are experiencing any difficulties with the Durable Medical Equipment  (DME) Company you may be using or is assigned to you.  Two weeks have passed and you have not received an appointment for a scheduled procedure.  Two weeks have passed since you underwent a test and/or procedure and you have not received your results.     If you are using a CPAP/BIPAP, or Home Ventilator Device- Please note the following.  Currently, many DMEs are experiencing supply chain difficulties and orders for equipment may be back logged several weeks.     Your  Durable Medical Equipment (DME ) company is supposed to provide you with replacement filters, tubing and masks. You can either call your DME when you need new supplies or you can arrange for an automatic shipment schedule.    Your need to be seen by our office at lat minimum of every 12 months in order to renew the prescription for these supplies.   Please make note of who your DME company is and their phone number.   Please make sure that you clean your mask and hosing on a regular basis.  Your DME can provide you with additional information regarding proper care and cleaning of your

## 2024-06-26 NOTE — ASSESSMENT & PLAN NOTE
Well controlled with current CPAP settings.  Continue current settings, will place an order/Rx for supplies.  If he continues to do well, and need to see him in 1 year, sooner if he starts having issues related to sleep that need to be addressed.

## 2024-06-26 NOTE — PROGRESS NOTES
Alexx Yoo presents today for   Chief Complaint   Patient presents with    Follow-up     CPAP       Is someone accompanying this pt? no    Is the patient using any DME equipment during OV? no    -DME Company: [employer]    Depression Screenin/26/2024     9:46 AM   PHQ-9    Little interest or pleasure in doing things 0   Feeling down, depressed, or hopeless 0   PHQ-2 Score 0   PHQ-9 Total Score 0        [place] Sleepiness Scale:      2024     9:48 AM   Sleep Medicine   Sitting and reading 1   Watching TV 1   Sitting, inactive in a public place (e.g. a theatre or a meeting) 0   As a passenger in a car for an hour without a break 2   Lying down to rest in the afternoon when circumstances permit 0   Sitting and talking to someone 0   Sitting quietly after a lunch without alcohol 0   In a car, while stopped for a few minutes in traffic 0   [place] Sleepiness Score 4       Stop-Ban/19/2024     8:00 AM   STOP-BANG QUESTIONNAIRE   Are you a loud and/or regular snorer? 1   Do you often feel tired or groggy upon awakening or do you awaken with a headache? 1   Have you been observed to gasp or stop breathing during sleep? 0   Are you often tired or fatigued during wake time hours?  0   Do you fall asleep sitting, reading, watching TV or driving? 0   Do you often have problems with memory or concentration? 0   Do you have or are you being treated for high blood pressure? 1   Recent BMI (Calculated) 40.1   Is BMI greater than 35 kg/m2? 1=Yes   Age older than 50 years old? 0=No   Is your neck circumference greater than 17 inches (Male) or 16 inches (Female)? 0   Gender - Male 1=Yes   STOP-Bang Total Score 45.1           Coordination of Care:  1. Have you been to the ER, urgent care clinic since your last visit? Hospitalized since your last visit?  no    2. Have you seen or consulted any other health care providers outside of the [facility] since your last visit? Include any pap smears or colon 
Hyperlipidemia    Bruxism, sleep-related    Obstructive sleep apnea (adult) (pediatric)       Past Medical History:   Diagnosis Date    GERD (gastroesophageal reflux disease)     uses OTC meds    Hypertension     no meds as of Jan 2018    Morbid obesity (HCC)     Morbid obesity with body mass index of 40.0-49.9 ([facility])        Past Surgical History:   Procedure Laterality Date    KNEE ARTHROSCOPY      ORTHOPEDIC SURGERY      ankle surgery / no hardware    TONSILLECTOMY         Family History   Problem Relation Age of Onset    Cancer Mother     Hypertension Mother     Diabetes Mother     Hypertension Father     Diabetes Father        Objective:     Vitals:    Weight BMI   Wt Readings from Last 3 Encounters:   06/26/24 (!) 149.2 kg (329 lb)   04/08/24 (!) 150 kg (330 lb 9.6 oz)   02/19/24 (!) 147 kg (324 lb)    Body mass index is 40.05 kg/m².     BP HR SaO2   BP Readings from Last 3 Encounters:   06/26/24 (!) 140/89   04/08/24 118/85   02/19/24 (!) 147/98    Pulse Readings from Last 3 Encounters:   06/26/24 82   04/08/24 99   02/19/24 94    SpO2 Readings from Last 3 Encounters:   06/26/24 96%   04/08/24 99%   02/19/24 96%          Physical Exam  Constitutional:       Appearance: He is obese.   HENT:      Head: Normocephalic and atraumatic.      Comments: OC/OP: Tongue midline, soft palate without lesions, tonsils 1+, mallampati 4, FTP 4, class I occlusion, very large tongue  Eyes:      Extraocular Movements: Extraocular movements intact.      Pupils: Pupils are equal, round, and reactive to light.   Cardiovascular:      Rate and Rhythm: Normal rate.   Pulmonary:      Effort: Pulmonary effort is normal.   Neurological:      General: No focal deficit present.      Mental Status: He is alert and oriented to person, place, and time.   Psychiatric:         Mood and Affect: Mood normal.         Behavior: Behavior normal.       The mandibular molar Class :   [x]1 []2 []3 (from prior exam)        Mallampati I Airway

## 2024-07-08 ENCOUNTER — CLINICAL DOCUMENTATION (OUTPATIENT)
Age: 44
End: 2024-07-08

## 2024-09-17 ENCOUNTER — HOSPITAL ENCOUNTER (OUTPATIENT)
Facility: HOSPITAL | Age: 44
Setting detail: SPECIMEN
Discharge: HOME | End: 2024-09-20

## 2024-09-17 ENCOUNTER — LAB (OUTPATIENT)
Facility: CLINIC | Age: 44
End: 2024-09-17

## 2024-09-17 DIAGNOSIS — R73.02 IMPAIRED GLUCOSE TOLERANCE: ICD-10-CM

## 2024-09-17 DIAGNOSIS — I10 ESSENTIAL (PRIMARY) HYPERTENSION: ICD-10-CM

## 2024-09-17 LAB — SENTARA SPECIMEN COLLECTION: NORMAL

## 2024-09-17 PROCEDURE — 99001 SPECIMEN HANDLING PT-LAB: CPT

## 2024-09-18 LAB
A/G RATIO: 1.9 RATIO (ref 1.1–2.6)
ALBUMIN: 4.2 G/DL (ref 3.5–5)
ALP BLD-CCNC: 72 U/L (ref 25–115)
ALT SERPL-CCNC: 21 U/L (ref 5–40)
ANION GAP SERPL CALCULATED.3IONS-SCNC: 12 MMOL/L (ref 3–15)
AST SERPL-CCNC: 17 U/L (ref 10–37)
BILIRUB SERPL-MCNC: 0.5 MG/DL (ref 0.2–1.2)
BUN BLDV-MCNC: 14 MG/DL (ref 6–22)
CALCIUM SERPL-MCNC: 9.1 MG/DL (ref 8.4–10.5)
CHLORIDE BLD-SCNC: 102 MMOL/L (ref 98–110)
CHOLESTEROL, TOTAL: 171 MG/DL (ref 110–200)
CHOLESTEROL/HDL RATIO: 2.9 (ref 0–5)
CO2: 30 MMOL/L (ref 20–32)
CREAT SERPL-MCNC: 1 MG/DL (ref 0.5–1.2)
CREATININE URINE: 152 MG/DL
ESTIMATED AVERAGE GLUCOSE: 113 MG/DL (ref 91–123)
GFR, ESTIMATED: >60 ML/MIN/1.73 SQ.M.
GLOBULIN: 2.2 G/DL (ref 2–4)
GLUCOSE: 96 MG/DL (ref 70–99)
HBA1C MFR BLD: 5.6 % (ref 4.8–5.6)
HDLC SERPL-MCNC: 59 MG/DL
LDL CHOLESTEROL: 93 MG/DL (ref 50–99)
LDL/HDL RATIO: 1.6
MICROALB/CREAT RATIO (UG/MG CREAT.): NORMAL
MICROALBUMIN/CREAT 24H UR: <12 MG/L (ref 0.1–17)
NON-HDL CHOLESTEROL: 112 MG/DL
POTASSIUM SERPL-SCNC: 4 MMOL/L (ref 3.5–5.5)
SODIUM BLD-SCNC: 144 MMOL/L (ref 133–145)
TOTAL PROTEIN: 6.4 G/DL (ref 6.4–8.3)
TRIGL SERPL-MCNC: 92 MG/DL (ref 40–149)
VLDLC SERPL CALC-MCNC: 18 MG/DL (ref 8–30)

## 2024-10-08 ENCOUNTER — OFFICE VISIT (OUTPATIENT)
Facility: CLINIC | Age: 44
End: 2024-10-08
Payer: COMMERCIAL

## 2024-10-08 VITALS
DIASTOLIC BLOOD PRESSURE: 109 MMHG | HEART RATE: 67 BPM | RESPIRATION RATE: 18 BRPM | TEMPERATURE: 98.6 F | WEIGHT: 315 LBS | HEIGHT: 76 IN | OXYGEN SATURATION: 100 % | BODY MASS INDEX: 38.36 KG/M2 | SYSTOLIC BLOOD PRESSURE: 170 MMHG

## 2024-10-08 DIAGNOSIS — R73.02 IMPAIRED GLUCOSE TOLERANCE: ICD-10-CM

## 2024-10-08 DIAGNOSIS — I10 ESSENTIAL (PRIMARY) HYPERTENSION: Primary | ICD-10-CM

## 2024-10-08 DIAGNOSIS — K75.81 NASH (NONALCOHOLIC STEATOHEPATITIS): ICD-10-CM

## 2024-10-08 DIAGNOSIS — G47.33 OSA (OBSTRUCTIVE SLEEP APNEA): ICD-10-CM

## 2024-10-08 PROCEDURE — 3080F DIAST BP >= 90 MM HG: CPT | Performed by: INTERNAL MEDICINE

## 2024-10-08 PROCEDURE — 3077F SYST BP >= 140 MM HG: CPT | Performed by: INTERNAL MEDICINE

## 2024-10-08 PROCEDURE — 99214 OFFICE O/P EST MOD 30 MIN: CPT | Performed by: INTERNAL MEDICINE

## 2024-10-08 RX ORDER — METHYLPREDNISOLONE 4 MG
4 TABLET, DOSE PACK ORAL SEE ADMIN INSTRUCTIONS
COMMUNITY
Start: 2024-09-30

## 2024-10-08 SDOH — ECONOMIC STABILITY: FOOD INSECURITY: WITHIN THE PAST 12 MONTHS, YOU WORRIED THAT YOUR FOOD WOULD RUN OUT BEFORE YOU GOT MONEY TO BUY MORE.: NEVER TRUE

## 2024-10-08 SDOH — ECONOMIC STABILITY: FOOD INSECURITY: WITHIN THE PAST 12 MONTHS, THE FOOD YOU BOUGHT JUST DIDN'T LAST AND YOU DIDN'T HAVE MONEY TO GET MORE.: NEVER TRUE

## 2024-10-08 SDOH — ECONOMIC STABILITY: INCOME INSECURITY: HOW HARD IS IT FOR YOU TO PAY FOR THE VERY BASICS LIKE FOOD, HOUSING, MEDICAL CARE, AND HEATING?: NOT HARD AT ALL

## 2024-10-08 SDOH — ECONOMIC STABILITY: TRANSPORTATION INSECURITY
IN THE PAST 12 MONTHS, HAS LACK OF TRANSPORTATION KEPT YOU FROM MEETINGS, WORK, OR FROM GETTING THINGS NEEDED FOR DAILY LIVING?: NO

## 2024-10-08 NOTE — PROGRESS NOTES
Alexx Yoo presents today for   Chief Complaint   Patient presents with    Follow-up     6 mon f/u    Hypertension     6 mon f/u       Bp recheck: 170/109     \"Have you been to the ER, urgent care clinic since your last visit?  Hospitalized since your last visit?\"    NO    “Have you seen or consulted any other health care providers outside of [facility] since your last visit?”    YES - When: approximately 2  weeks ago.  Where and Why: [facility]-f/u.

## 2024-10-08 NOTE — PROGRESS NOTES
[facility] OF [facility]:  10/14/2024, MRN: [id number]      Alexx Yoo is a 44 y.o. male and presents to clinic for Follow-up (6 mon f/u) and Hypertension (6 mon f/u)      Subjective:   The patient is a 44-year-old male with history of CALVILLO, hyperuricemia, vitamin D deficiency, obesity, prediabetes, GERD, JAJA (followed by ), and hypertension with white coat hypertension.     1. HTN, CALVILLO, and JAJA: BP is 170/109. +On Norvasc 10 mg daily and HCTZ 25 mg daily.  CPAP: yes.  He is using his machine every night. Asymptomatic. Recent lab work shows an unremarkable CMP, lipid panel, and urine protein screen. He admits to work life balance issues - environmental stress with time management. He has a bp machine at home. \"My wife's happy because I'm not snoring.\"  He stopped eating a lot of shellfish.    I reviewed his Sleep Medicine note in June 2024. He is to rtc in a year.    2. Prediabetes: His weight is down to 318 pounds.  He is lost weight since earlier this year.     Latest Reference Range & Units 09/17/24 09:11   Hemoglobin A1C 4.8 - 5.6 % 5.6     3. Health Maintenance:  - He declines a flu shot.  +Right foot pain (severe) today. He has bone spurs along his feet and OA along his ankles - per what orthopedics told him per his hx. He's had severe pain x 2.5 wks.        Patient Active Problem List    Diagnosis Date Noted    Obstructive sleep apnea (adult) (pediatric) 03/11/2024    Bruxism, sleep-related 02/19/2024    Hyperlipidemia 10/12/2023    Impaired glucose tolerance 07/03/2019    CALVILLO (nonalcoholic steatohepatitis) 08/31/2018    Hyperuricemia 08/31/2018    Vitamin D deficiency 08/31/2018    GERD (gastroesophageal reflux disease)     Morbid obesity     Hypertension 12/01/2015       Current Outpatient Medications   Medication Sig Dispense Refill    methylPREDNISolone (MEDROL DOSEPACK) 4 MG tablet Take 1 tablet by mouth See Admin Instructions      amLODIPine (NORVASC) 10 MG tablet TAKE 1 TABLET BY MOUTH

## 2024-10-14 ASSESSMENT — ENCOUNTER SYMPTOMS
SORE THROAT: 0
BLOOD IN STOOL: 0
ANAL BLEEDING: 0
ABDOMINAL PAIN: 0
SHORTNESS OF BREATH: 0
EYE PAIN: 0
COUGH: 0

## 2024-11-12 DIAGNOSIS — I10 ESSENTIAL (PRIMARY) HYPERTENSION: ICD-10-CM

## 2024-11-13 RX ORDER — HYDROCHLOROTHIAZIDE 25 MG/1
25 TABLET ORAL DAILY
Qty: 90 TABLET | Refills: 1 | Status: SHIPPED | OUTPATIENT
Start: 2024-11-13

## 2025-04-01 ENCOUNTER — LAB (OUTPATIENT)
Facility: CLINIC | Age: 45
End: 2025-04-01

## 2025-04-01 ENCOUNTER — HOSPITAL ENCOUNTER (OUTPATIENT)
Facility: HOSPITAL | Age: 45
Setting detail: SPECIMEN
Discharge: HOME | End: 2025-04-04

## 2025-04-01 DIAGNOSIS — R73.02 IMPAIRED GLUCOSE TOLERANCE: ICD-10-CM

## 2025-04-01 LAB — SENTARA SPECIMEN COLLECTION: NORMAL

## 2025-04-01 PROCEDURE — 99001 SPECIMEN HANDLING PT-LAB: CPT

## 2025-04-02 ENCOUNTER — RESULTS FOLLOW-UP (OUTPATIENT)
Facility: CLINIC | Age: 45
End: 2025-04-02

## 2025-04-02 LAB
ESTIMATED AVERAGE GLUCOSE: 112 MG/DL (ref 91–123)
HBA1C MFR BLD: 5.5 % (ref 4.8–5.6)

## 2025-06-25 DIAGNOSIS — I10 ESSENTIAL (PRIMARY) HYPERTENSION: ICD-10-CM

## 2025-06-25 RX ORDER — HYDROCHLOROTHIAZIDE 25 MG/1
25 TABLET ORAL DAILY
Qty: 90 TABLET | Refills: 1 | Status: SHIPPED | OUTPATIENT
Start: 2025-06-25